# Patient Record
Sex: FEMALE | Race: WHITE | NOT HISPANIC OR LATINO | Employment: UNEMPLOYED | URBAN - METROPOLITAN AREA
[De-identification: names, ages, dates, MRNs, and addresses within clinical notes are randomized per-mention and may not be internally consistent; named-entity substitution may affect disease eponyms.]

---

## 2023-12-01 LAB
EXTERNAL HIV SCREEN: NORMAL
HBA1C MFR BLD HPLC: 5.4 %
HCV AB SER-ACNC: NEGATIVE

## 2024-03-27 ENCOUNTER — HOSPITAL ENCOUNTER (EMERGENCY)
Facility: HOSPITAL | Age: 29
Discharge: HOME | End: 2024-03-27
Attending: FAMILY MEDICINE | Admitting: FAMILY MEDICINE
Payer: MEDICAID

## 2024-03-27 VITALS
OXYGEN SATURATION: 98 % | TEMPERATURE: 97 F | RESPIRATION RATE: 16 BRPM | WEIGHT: 275 LBS | HEIGHT: 62 IN | SYSTOLIC BLOOD PRESSURE: 113 MMHG | DIASTOLIC BLOOD PRESSURE: 58 MMHG | BODY MASS INDEX: 50.61 KG/M2 | HEART RATE: 87 BPM

## 2024-03-27 DIAGNOSIS — R11.2 NAUSEA AND VOMITING, UNSPECIFIED VOMITING TYPE: Primary | ICD-10-CM

## 2024-03-27 DIAGNOSIS — Z34.90 PREGNANCY: ICD-10-CM

## 2024-03-27 DIAGNOSIS — R19.7 DIARRHEA: ICD-10-CM

## 2024-03-27 LAB
ALBUMIN SERPL BCP-MCNC: 3.7 G/DL (ref 3.5–5)
ALP SERPL-CCNC: 150 U/L (ref 34–104)
ALT SERPL W P-5'-P-CCNC: 61 U/L (ref 7–52)
ANION GAP SERPL CALCULATED.3IONS-SCNC: 10 MMOL/L (ref 4–13)
AST SERPL W P-5'-P-CCNC: 24 U/L (ref 13–39)
BACTERIA UR QL AUTO: ABNORMAL /HPF
BASOPHILS # BLD AUTO: 0.02 THOUSANDS/ÂΜL (ref 0–0.1)
BASOPHILS NFR BLD AUTO: 0 % (ref 0–1)
BILIRUB SERPL-MCNC: 0.54 MG/DL (ref 0.2–1)
BILIRUB UR QL STRIP: ABNORMAL
BUN SERPL-MCNC: 6 MG/DL (ref 5–25)
CALCIUM SERPL-MCNC: 8.5 MG/DL (ref 8.4–10.2)
CHLORIDE SERPL-SCNC: 101 MMOL/L (ref 96–108)
CLARITY UR: ABNORMAL
CO2 SERPL-SCNC: 24 MMOL/L (ref 21–32)
COLOR UR: YELLOW
CREAT SERPL-MCNC: 0.5 MG/DL (ref 0.6–1.3)
EOSINOPHIL # BLD AUTO: 0.02 THOUSAND/ÂΜL (ref 0–0.61)
EOSINOPHIL NFR BLD AUTO: 0 % (ref 0–6)
ERYTHROCYTE [DISTWIDTH] IN BLOOD BY AUTOMATED COUNT: 13 % (ref 11.6–15.1)
GFR SERPL CREATININE-BSD FRML MDRD: 132 ML/MIN/1.73SQ M
GLUCOSE SERPL-MCNC: 88 MG/DL (ref 65–140)
GLUCOSE UR STRIP-MCNC: NEGATIVE MG/DL
HCT VFR BLD AUTO: 36.9 % (ref 34.8–46.1)
HGB BLD-MCNC: 12.3 G/DL (ref 11.5–15.4)
HGB UR QL STRIP.AUTO: NEGATIVE
IMM GRANULOCYTES # BLD AUTO: 0.05 THOUSAND/UL (ref 0–0.2)
IMM GRANULOCYTES NFR BLD AUTO: 0 % (ref 0–2)
KETONES UR STRIP-MCNC: ABNORMAL MG/DL
LEUKOCYTE ESTERASE UR QL STRIP: NEGATIVE
LYMPHOCYTES # BLD AUTO: 1.23 THOUSANDS/ÂΜL (ref 0.6–4.47)
LYMPHOCYTES NFR BLD AUTO: 11 % (ref 14–44)
MCH RBC QN AUTO: 29.3 PG (ref 26.8–34.3)
MCHC RBC AUTO-ENTMCNC: 33.3 G/DL (ref 31.4–37.4)
MCV RBC AUTO: 88 FL (ref 82–98)
MONOCYTES # BLD AUTO: 0.56 THOUSAND/ÂΜL (ref 0.17–1.22)
MONOCYTES NFR BLD AUTO: 5 % (ref 4–12)
MUCOUS THREADS UR QL AUTO: ABNORMAL
NEUTROPHILS # BLD AUTO: 9.69 THOUSANDS/ÂΜL (ref 1.85–7.62)
NEUTS SEG NFR BLD AUTO: 84 % (ref 43–75)
NITRITE UR QL STRIP: NEGATIVE
NON-SQ EPI CELLS URNS QL MICRO: ABNORMAL /HPF
NRBC BLD AUTO-RTO: 0 /100 WBCS
PH UR STRIP.AUTO: 7 [PH]
PLATELET # BLD AUTO: 148 THOUSANDS/UL (ref 149–390)
PMV BLD AUTO: 11.6 FL (ref 8.9–12.7)
POTASSIUM SERPL-SCNC: 3.8 MMOL/L (ref 3.5–5.3)
PROT SERPL-MCNC: 7.3 G/DL (ref 6.4–8.4)
PROT UR STRIP-MCNC: ABNORMAL MG/DL
RBC # BLD AUTO: 4.2 MILLION/UL (ref 3.81–5.12)
RBC #/AREA URNS AUTO: ABNORMAL /HPF
SODIUM SERPL-SCNC: 135 MMOL/L (ref 135–147)
SP GR UR STRIP.AUTO: 1.02
UROBILINOGEN UR QL STRIP.AUTO: 1 E.U./DL
WBC # BLD AUTO: 11.57 THOUSAND/UL (ref 4.31–10.16)
WBC #/AREA URNS AUTO: ABNORMAL /HPF

## 2024-03-27 PROCEDURE — 85025 COMPLETE CBC W/AUTO DIFF WBC: CPT

## 2024-03-27 PROCEDURE — 96375 TX/PRO/DX INJ NEW DRUG ADDON: CPT

## 2024-03-27 PROCEDURE — 81001 URINALYSIS AUTO W/SCOPE: CPT

## 2024-03-27 PROCEDURE — 36415 COLL VENOUS BLD VENIPUNCTURE: CPT

## 2024-03-27 PROCEDURE — 99283 EMERGENCY DEPT VISIT LOW MDM: CPT

## 2024-03-27 PROCEDURE — 96376 TX/PRO/DX INJ SAME DRUG ADON: CPT

## 2024-03-27 PROCEDURE — 96365 THER/PROPH/DIAG IV INF INIT: CPT

## 2024-03-27 PROCEDURE — 80053 COMPREHEN METABOLIC PANEL: CPT

## 2024-03-27 PROCEDURE — 87086 URINE CULTURE/COLONY COUNT: CPT

## 2024-03-27 RX ORDER — ONDANSETRON 4 MG/1
4 TABLET, FILM COATED ORAL EVERY 12 HOURS PRN
Qty: 14 TABLET | Refills: 0 | Status: SHIPPED | OUTPATIENT
Start: 2024-03-27 | End: 2024-04-03

## 2024-03-27 RX ORDER — ONDANSETRON 2 MG/ML
4 INJECTION INTRAMUSCULAR; INTRAVENOUS ONCE
Status: COMPLETED | OUTPATIENT
Start: 2024-03-27 | End: 2024-03-27

## 2024-03-27 RX ORDER — SODIUM CHLORIDE, SODIUM GLUCONATE, SODIUM ACETATE, POTASSIUM CHLORIDE, MAGNESIUM CHLORIDE, SODIUM PHOSPHATE, DIBASIC, AND POTASSIUM PHOSPHATE .53; .5; .37; .037; .03; .012; .00082 G/100ML; G/100ML; G/100ML; G/100ML; G/100ML; G/100ML; G/100ML
1000 INJECTION, SOLUTION INTRAVENOUS ONCE
Status: COMPLETED | OUTPATIENT
Start: 2024-03-27 | End: 2024-03-27

## 2024-03-27 RX ADMIN — ONDANSETRON 4 MG: 2 INJECTION INTRAMUSCULAR; INTRAVENOUS at 11:51

## 2024-03-27 RX ADMIN — ONDANSETRON 4 MG: 2 INJECTION INTRAMUSCULAR; INTRAVENOUS at 10:18

## 2024-03-27 RX ADMIN — SODIUM CHLORIDE, SODIUM GLUCONATE, SODIUM ACETATE, POTASSIUM CHLORIDE, MAGNESIUM CHLORIDE, SODIUM PHOSPHATE, DIBASIC, AND POTASSIUM PHOSPHATE 1000 ML: .53; .5; .37; .037; .03; .012; .00082 INJECTION, SOLUTION INTRAVENOUS at 10:19

## 2024-03-27 NOTE — ED PROVIDER NOTES
History  Chief Complaint   Patient presents with    Vomiting     Patient arrives with complaints of illness for the past 2 weeks. States last night started with N/V/D. 26 weeks pregnant.      Patient is a 28-year-old female with no significant past medical history. Patient is 28 weeks pregnant. . Presenting today with chief complaints of nausea, vomiting, and diarrhea. Patient states that she began to not feel well about 2 weeks ago starting with a sore throat and nasal congestion. Since then, symptoms had resolved. Yesterday around 6 pm she started with nausea, vomiting, and diarrhea. Has experienced about 5 episodes of diarrhea, some abdominal cramping, and 4 episodes of vomiting. Explains that the diarrhea is more of a soft stool consistency and is not watery. Denies any blood present. Denies any fever, chills, chest pain, shortness of breath, dysuria, hematuria or vaginal bleeding. States that she has had a decreased appetite leading up to the nausea and vomiting episodes and has been drinking water in small quantities secondary to the nausea. Patient is currently living in a household with 6 other children who have also been sick with similar symptoms. She has recently moved to the area from [place] and does not have a local OBGYN. Patient has stopped taking prenatal vitamins about 3 months ago as they were making her sick. Per patient, her previous OBGYN said stopping prenatals was okay as long as she was taking a child's multivitamin which patient states that she is taking. No history of preeclampsia or gestational diabetes.      Vomiting  Associated symptoms: diarrhea and headaches    Associated symptoms: no chills, no fever and no sore throat        Prior to Admission Medications   Prescriptions Last Dose Informant Patient Reported? Taking?   Prenatal MV-Min-Fe Fum-FA-DHA (PRENATAL 1 PO)   Yes Yes   Sig: Take by mouth      Facility-Administered Medications: None       History reviewed. No pertinent  past medical history.    Past Surgical History:   Procedure Laterality Date    FRACTURE SURGERY         History reviewed. No pertinent family history.  I have reviewed and agree with the history as documented.    E-Cigarette/Vaping    E-Cigarette Use Never User      E-Cigarette/Vaping Substances     Social History     Tobacco Use    Smoking status: Never    Smokeless tobacco: Never   Vaping Use    Vaping status: Never Used   Substance Use Topics    Alcohol use: Not Currently    Drug use: Not Currently       Review of Systems   Constitutional:  Positive for fatigue. Negative for chills and fever.   HENT:  Negative for congestion, rhinorrhea and sore throat.    Respiratory:  Negative for chest tightness and shortness of breath.    Cardiovascular:  Negative for chest pain, palpitations and leg swelling.   Gastrointestinal:  Positive for diarrhea, nausea and vomiting. Negative for blood in stool.   Genitourinary:  Negative for decreased urine volume, dysuria and hematuria.   Neurological:  Positive for headaches.   All other systems reviewed and are negative.      Physical Exam  Physical Exam  Vitals and nursing note reviewed.   Constitutional:       Appearance: Normal appearance.   HENT:      Head: Normocephalic and atraumatic.      Right Ear: Tympanic membrane, ear canal and external ear normal.      Left Ear: Tympanic membrane, ear canal and external ear normal.      Mouth/Throat:      Mouth: Mucous membranes are moist.      Pharynx: Oropharynx is clear.   Cardiovascular:      Rate and Rhythm: Normal rate and regular rhythm.      Pulses: Normal pulses.      Heart sounds: Normal heart sounds.   Pulmonary:      Effort: Pulmonary effort is normal.      Breath sounds: Normal breath sounds.   Abdominal:      General: Bowel sounds are normal.      Tenderness: There is no abdominal tenderness. There is no guarding.      Comments: Abdomen soft and rounded, 28 weeks gestation   Musculoskeletal:         General: Normal range  of motion.   Skin:     General: Skin is warm and dry.      Capillary Refill: Capillary refill takes less than 2 seconds.   Neurological:      General: No focal deficit present.      Mental Status: She is alert and oriented to person, place, and time.   Psychiatric:         Mood and Affect: Mood normal.         Behavior: Behavior normal.         Vital Signs  ED Triage Vitals   Temperature Pulse Respirations Blood Pressure SpO2   03/27/24 1004 03/27/24 1004 03/27/24 1004 03/27/24 1004 03/27/24 1004   (!) 97 °F (36.1 °C) 98 18 131/60 99 %      Temp Source Heart Rate Source Patient Position - Orthostatic VS BP Location FiO2 (%)   03/27/24 1004 03/27/24 1223 03/27/24 1004 03/27/24 1004 --   Temporal Monitor Sitting Left arm       Pain Score       03/27/24 1004       6           Vitals:    03/27/24 1004 03/27/24 1050 03/27/24 1054 03/27/24 1223   BP: 131/60 132/67 122/59 113/58   Pulse: 98   87   Patient Position - Orthostatic VS: Sitting  Lying Lying         Visual Acuity      ED Medications  Medications   multi-electrolyte (ISOLYTE-S PH 7.4) bolus 1,000 mL (0 mL Intravenous Stopped 3/27/24 1119)   ondansetron (ZOFRAN) injection 4 mg (4 mg Intravenous Given 3/27/24 1018)   ondansetron (ZOFRAN) injection 4 mg (4 mg Intravenous Given 3/27/24 1151)       Diagnostic Studies  Results Reviewed       Procedure Component Value Units Date/Time    Urine Microscopic [[id number]]  (Abnormal) Collected: 03/27/24 1120    Lab Status: Final result Specimen: Urine, Clean Catch Updated: 03/27/24 1131     RBC, UA 0-1 /hpf      WBC, UA 0-1 /hpf      Epithelial Cells Occasional /hpf      Bacteria, UA Occasional /hpf      MUCUS THREADS Occasional     URINE COMMENT --    UA w Reflex to Microscopic w Reflex to Culture [[id number]]  (Abnormal) Collected: 03/27/24 1120    Lab Status: Final result Specimen: Urine, Clean Catch Updated: 03/27/24 1126     Color, UA Yellow     Clarity, UA Hazy     Specific Gravity, UA 1.025     pH, UA 7.0      Leukocytes, UA Negative     Nitrite, UA Negative     Protein, UA Trace mg/dl      Glucose, UA Negative mg/dl      Ketones, UA 80 (3+) mg/dl      Urobilinogen, UA 1.0 E.U./dl      Bilirubin, UA 1+     Occult Blood, UA Negative     URINE COMMENT --    Urine culture [[id number]] Collected: 03/27/24 1120    Lab Status: In process Specimen: Urine, Clean Catch Updated: 03/27/24 1126    CBC and differential [[id number]]  (Abnormal) Collected: 03/27/24 1010    Lab Status: Final result Specimen: Blood from Arm, Right Updated: 03/27/24 1032     WBC 11.57 Thousand/uL      RBC 4.20 Million/uL      Hemoglobin 12.3 g/dL      Hematocrit 36.9 %      MCV 88 fL      MCH 29.3 pg      MCHC 33.3 g/dL      RDW 13.0 %      MPV 11.6 fL      Platelets 148 Thousands/uL      nRBC 0 /100 WBCs      Neutrophils Relative 84 %      Immature Grans % 0 %      Lymphocytes Relative 11 %      Monocytes Relative 5 %      Eosinophils Relative 0 %      Basophils Relative 0 %      Neutrophils Absolute 9.69 Thousands/µL      Absolute Immature Grans 0.05 Thousand/uL      Absolute Lymphocytes 1.23 Thousands/µL      Absolute Monocytes 0.56 Thousand/µL      Eosinophils Absolute 0.02 Thousand/µL      Basophils Absolute 0.02 Thousands/µL     Comprehensive metabolic panel [[id number]]  (Abnormal) Collected: 03/27/24 1010    Lab Status: Final result Specimen: Blood from Arm, Right Updated: 03/27/24 1029     Sodium 135 mmol/L      Potassium 3.8 mmol/L      Chloride 101 mmol/L      CO2 24 mmol/L      ANION GAP 10 mmol/L      BUN 6 mg/dL      Creatinine 0.50 mg/dL      Glucose 88 mg/dL      Calcium 8.5 mg/dL      AST 24 U/L      ALT 61 U/L      Alkaline Phosphatase 150 U/L      Total Protein 7.3 g/dL      Albumin 3.7 g/dL      Total Bilirubin 0.54 mg/dL      eGFR 132 ml/min/1.73sq m     Narrative:      National Kidney Disease Foundation guidelines for Chronic Kidney Disease (CKD):     Stage 1 with normal or high GFR (GFR > 90 mL/min/1.73 square meters)    Stage 2 Mild  CKD (GFR = 60-89 mL/min/1.73 square meters)    Stage 3A Moderate CKD (GFR = 45-59 mL/min/1.73 square meters)    Stage 3B Moderate CKD (GFR = 30-44 mL/min/1.73 square meters)    Stage 4 Severe CKD (GFR = 15-29 mL/min/1.73 square meters)    Stage 5 End Stage CKD (GFR <15 mL/min/1.73 square meters)  Note: GFR calculation is accurate only with a steady state creatinine                   No orders to display              Procedures  Procedures         ED Course  ED Course as of 03/27/24 1411   Wed Mar 27, 2024   1052 Fetal US showing HR of 151 BMP   1114 CBC and differential(!)  Elevated WBC 11.57, no anemia present.   1115 Comprehensive metabolic panel(!)  Slightly elevated ALK phos and ALT, otherwise stable electrolytes.   1148 Reviewed blood work/urine sample results with patient. Patient reports still feeling nauseous but has not vomited. Tolerated drinking water. Once fluids are finished, will reassess.   1219 Patient reports some relief after second dose of Zofran.                               SBIRT 20yo+      Flowsheet Row Most Recent Value   Initial Alcohol Screen: US AUDIT-C     1. How often do you have a drink containing alcohol? 0 Filed at: 03/27/2024 1007   2. How many drinks containing alcohol do you have on a typical day you are drinking?  0 Filed at: 03/27/2024 1007   3a. Male UNDER 65: How often do you have five or more drinks on one occasion? 0 Filed at: 03/27/2024 1007   3b. FEMALE Any Age, or MALE 65+: How often do you have 4 or more drinks on one occassion? 0 Filed at: 03/27/2024 1007   Audit-C Score 0 Filed at: 03/27/2024 1007   AMEE: How many times in the past year have you...    Used an illegal drug or used a prescription medication for non-medical reasons? Never Filed at: 03/27/2024 1007                      Medical Decision Making  Patient presents for nausea, vomiting, and diarrhea starting yesterday. Upon examination, patient is resting comfortably on the stretcher. Vital signs are within  normal limits. Patient is afebrile. Initial blood pressure was 131/60, repeat blood pressure of 122/59. Examination of ears show normal internal canals and tympanic membranes. Oral cavity is moist. No abnormal heart sounds and lungs are clear bilaterally. Bowel sounds present x4. Abdomen is soft and rounded. Fetal US performed and fetal heart qepe=334 BPM. No lower extremity edema present.    DDX include but are not limited to: electrolyte imbalance, dehydration, urinary tract infection, COVID, influenza, RSV, and other viral syndromes.    Discussed with patient treatment plan to include:  Blood work (CBC, CMP)  Urinalysis  IV hydration  Antiemetics     CMP does not indicate electrolyte or acute dehydration. CBC showing slight elevation in WBC of 11.57, likely a result of multiple episodes of vomiting and diarrhea. Urinalysis is not concerning for UTI. Discussed results with patient. Symptoms are likely viral in nature. Encouraged patient to  Zofran prescription at pharmacy and call to establish practice with PCP and OBGYN (referrals placed). Encouraged increase fluid intake. Strict ED return precautions reviewed and patient verbalized understanding of discharge instructions.     Amount and/or Complexity of Data Reviewed  Labs: ordered. Decision-making details documented in ED Course.     Details: Reviewed with patient.     Risk  Prescription drug management.             Disposition  Final diagnoses:   Nausea and vomiting, unspecified vomiting type   Diarrhea   Pregnancy     Time reflects when diagnosis was documented in both MDM as applicable and the Disposition within this note       Time User Action Codes Description Comment    3/27/2024 10:17 AM Jenniffer Liceal Add [R11.2] Nausea and vomiting, unspecified vomiting type     3/27/2024 10:17 AM Tonya Licea Add [R19.7] Diarrhea     3/27/2024 10:21 AM Tonya Licea Add [Z3A.28] 28 weeks gestation of pregnancy     3/27/2024 10:21 AM Jenniffer Liceal  Add [Z34.90] Pregnancy     3/27/2024 11:05 AM Tonya Licea Remove [Z3A.28] 28 weeks gestation of pregnancy           ED Disposition       ED Disposition   Discharge    Condition   Stable    Date/Time   Wed Mar 27, 2024 1219    Comment   Ramez Scott discharge to home/self care.                   Follow-up Information       Follow up With Specialties Details Why Contact Info Additional Information    [facility] Family Medicine Call in 1 week establish practice [address] [address] [phone] [facility], [address], [place], [address], [phone]    [facility] Emergency Medicine  If symptoms worsen [address] [facility] [address] [phone] [facility] Emergency Department, [address], [place] [address]            Discharge Medication List as of 3/27/2024 12:22 PM        START taking these medications    Details   ondansetron (ZOFRAN) 4 mg tablet Take 1 tablet (4 mg total) by mouth every 12 (twelve) hours as needed for nausea or vomiting for up to 7 days, Starting Wed 3/27/2024, Until Wed 4/3/2024 at 2359, Normal           CONTINUE these medications which have NOT CHANGED    Details   Prenatal MV-Min-Fe Fum-FA-DHA (PRENATAL 1 PO) Take by mouth, Historical Med                 PDMP Review       None            ED Provider  Electronically Signed by             JERALD Sandy  03/27/24 3118

## 2024-03-27 NOTE — DISCHARGE INSTRUCTIONS
May use Zofran 2x/day for nausea. Continue to increase fluid intake and rest. Call to make appointment with PCP and OBGYN as referrals have been placed. Return to ED for any worsening symptoms.

## 2024-03-29 LAB — BACTERIA UR CULT: NORMAL

## 2024-04-22 ENCOUNTER — TELEPHONE (OUTPATIENT)
Age: 29
End: 2024-04-22

## 2024-04-22 NOTE — TELEPHONE ENCOUNTER
Patient called and she is transferring from [facility] and she is 31 weeks pregnant and she had all of her records faxed over. She has not been seen since 20 weeks pregnant.  Please call patient back at [phone] . Thank you

## 2024-04-24 ENCOUNTER — ROUTINE PRENATAL (OUTPATIENT)
Dept: OBGYN CLINIC | Facility: MEDICAL CENTER | Age: 29
End: 2024-04-24
Payer: COMMERCIAL

## 2024-04-24 ENCOUNTER — TELEPHONE (OUTPATIENT)
Age: 29
End: 2024-04-24

## 2024-04-24 VITALS
HEIGHT: 62 IN | SYSTOLIC BLOOD PRESSURE: 115 MMHG | BODY MASS INDEX: 52.33 KG/M2 | WEIGHT: 284.4 LBS | DIASTOLIC BLOOD PRESSURE: 64 MMHG

## 2024-04-24 DIAGNOSIS — Z23 ENCOUNTER FOR IMMUNIZATION: ICD-10-CM

## 2024-04-24 DIAGNOSIS — D69.6 THROMBOCYTOPENIA AFFECTING PREGNANCY (HCC): ICD-10-CM

## 2024-04-24 DIAGNOSIS — Z3A.32 32 WEEKS GESTATION OF PREGNANCY: ICD-10-CM

## 2024-04-24 DIAGNOSIS — O99.119 THROMBOCYTOPENIA AFFECTING PREGNANCY (HCC): ICD-10-CM

## 2024-04-24 DIAGNOSIS — Z3A.31 31 WEEKS GESTATION OF PREGNANCY: Primary | ICD-10-CM

## 2024-04-24 DIAGNOSIS — O36.60X0 FETAL MACROSOMIA AFFECTING MANAGEMENT OF MOTHER, ANTEPARTUM: ICD-10-CM

## 2024-04-24 DIAGNOSIS — O09.293 HISTORY OF SHOULDER DYSTOCIA IN PRIOR PREGNANCY, CURRENTLY PREGNANT IN THIRD TRIMESTER: ICD-10-CM

## 2024-04-24 DIAGNOSIS — O99.210 OBESITY IN PREGNANCY, ANTEPARTUM: ICD-10-CM

## 2024-04-24 DIAGNOSIS — O99.810 ABNORMAL GLUCOSE AFFECTING PREGNANCY: ICD-10-CM

## 2024-04-24 PROCEDURE — 99213 OFFICE O/P EST LOW 20 MIN: CPT | Performed by: OBSTETRICS & GYNECOLOGY

## 2024-04-24 PROCEDURE — 90715 TDAP VACCINE 7 YRS/> IM: CPT | Performed by: OBSTETRICS & GYNECOLOGY

## 2024-04-24 PROCEDURE — 90471 IMMUNIZATION ADMIN: CPT | Performed by: OBSTETRICS & GYNECOLOGY

## 2024-04-24 RX ORDER — FAMOTIDINE 20 MG/1
20 TABLET, FILM COATED ORAL 2 TIMES DAILY
COMMUNITY
End: 2024-05-15

## 2024-04-25 ENCOUNTER — APPOINTMENT (OUTPATIENT)
Dept: LAB | Facility: CLINIC | Age: 29
End: 2024-04-25
Payer: COMMERCIAL

## 2024-04-25 DIAGNOSIS — Z3A.31 31 WEEKS GESTATION OF PREGNANCY: ICD-10-CM

## 2024-04-25 LAB
ABO GROUP BLD: NORMAL
BASOPHILS # BLD AUTO: 0.03 THOUSANDS/ÂΜL (ref 0–0.1)
BASOPHILS NFR BLD AUTO: 0 % (ref 0–1)
BLD GP AB SCN SERPL QL: NEGATIVE
EOSINOPHIL # BLD AUTO: 0.03 THOUSAND/ÂΜL (ref 0–0.61)
EOSINOPHIL NFR BLD AUTO: 0 % (ref 0–6)
ERYTHROCYTE [DISTWIDTH] IN BLOOD BY AUTOMATED COUNT: 12.7 % (ref 11.6–15.1)
GLUCOSE 1H P 50 G GLC PO SERPL-MCNC: 138 MG/DL (ref 70–134)
HCT VFR BLD AUTO: 34.4 % (ref 34.8–46.1)
HGB BLD-MCNC: 11.5 G/DL (ref 11.5–15.4)
IMM GRANULOCYTES # BLD AUTO: 0.03 THOUSAND/UL (ref 0–0.2)
IMM GRANULOCYTES NFR BLD AUTO: 0 % (ref 0–2)
LYMPHOCYTES # BLD AUTO: 1.78 THOUSANDS/ÂΜL (ref 0.6–4.47)
LYMPHOCYTES NFR BLD AUTO: 17 % (ref 14–44)
MCH RBC QN AUTO: 28.8 PG (ref 26.8–34.3)
MCHC RBC AUTO-ENTMCNC: 33.4 G/DL (ref 31.4–37.4)
MCV RBC AUTO: 86 FL (ref 82–98)
MONOCYTES # BLD AUTO: 0.5 THOUSAND/ÂΜL (ref 0.17–1.22)
MONOCYTES NFR BLD AUTO: 5 % (ref 4–12)
NEUTROPHILS # BLD AUTO: 8.12 THOUSANDS/ÂΜL (ref 1.85–7.62)
NEUTS SEG NFR BLD AUTO: 78 % (ref 43–75)
NRBC BLD AUTO-RTO: 0 /100 WBCS
PLATELET # BLD AUTO: 132 THOUSANDS/UL (ref 149–390)
PMV BLD AUTO: 11.9 FL (ref 8.9–12.7)
RBC # BLD AUTO: 4 MILLION/UL (ref 3.81–5.12)
RH BLD: POSITIVE
SPECIMEN EXPIRATION DATE: NORMAL
TREPONEMA PALLIDUM IGG+IGM AB [PRESENCE] IN SERUM OR PLASMA BY IMMUNOASSAY: NORMAL
WBC # BLD AUTO: 10.49 THOUSAND/UL (ref 4.31–10.16)

## 2024-04-25 PROCEDURE — 82950 GLUCOSE TEST: CPT

## 2024-04-25 PROCEDURE — 86780 TREPONEMA PALLIDUM: CPT

## 2024-04-25 PROCEDURE — 86850 RBC ANTIBODY SCREEN: CPT

## 2024-04-25 PROCEDURE — 87491 CHLMYD TRACH DNA AMP PROBE: CPT | Performed by: OBSTETRICS & GYNECOLOGY

## 2024-04-25 PROCEDURE — 87591 N.GONORRHOEAE DNA AMP PROB: CPT | Performed by: OBSTETRICS & GYNECOLOGY

## 2024-04-25 PROCEDURE — 85025 COMPLETE CBC W/AUTO DIFF WBC: CPT

## 2024-04-25 PROCEDURE — 86901 BLOOD TYPING SEROLOGIC RH(D): CPT

## 2024-04-25 PROCEDURE — 86900 BLOOD TYPING SEROLOGIC ABO: CPT

## 2024-04-25 PROCEDURE — 36415 COLL VENOUS BLD VENIPUNCTURE: CPT

## 2024-04-26 LAB
C TRACH DNA SPEC QL NAA+PROBE: NEGATIVE
N GONORRHOEA DNA SPEC QL NAA+PROBE: NEGATIVE

## 2024-04-30 DIAGNOSIS — O99.810 ABNORMAL GLUCOSE AFFECTING PREGNANCY: Primary | ICD-10-CM

## 2024-04-30 PROBLEM — O99.210 OBESITY IN PREGNANCY, ANTEPARTUM: Status: ACTIVE | Noted: 2024-04-30

## 2024-04-30 PROBLEM — O09.293 HISTORY OF SHOULDER DYSTOCIA IN PRIOR PREGNANCY, CURRENTLY PREGNANT IN THIRD TRIMESTER: Status: ACTIVE | Noted: 2024-04-30

## 2024-05-01 ENCOUNTER — ROUTINE PRENATAL (OUTPATIENT)
Dept: PERINATAL CARE | Facility: OTHER | Age: 29
End: 2024-05-01
Payer: COMMERCIAL

## 2024-05-01 ENCOUNTER — TELEPHONE (OUTPATIENT)
Dept: PERINATAL CARE | Facility: OTHER | Age: 29
End: 2024-05-01

## 2024-05-01 VITALS
WEIGHT: 288.8 LBS | DIASTOLIC BLOOD PRESSURE: 70 MMHG | SYSTOLIC BLOOD PRESSURE: 112 MMHG | HEART RATE: 95 BPM | HEIGHT: 62 IN | BODY MASS INDEX: 53.15 KG/M2

## 2024-05-01 DIAGNOSIS — O36.60X0 FETAL MACROSOMIA AFFECTING MANAGEMENT OF MOTHER, ANTEPARTUM: ICD-10-CM

## 2024-05-01 DIAGNOSIS — Z3A.32 32 WEEKS GESTATION OF PREGNANCY: ICD-10-CM

## 2024-05-01 DIAGNOSIS — Z3A.31 31 WEEKS GESTATION OF PREGNANCY: ICD-10-CM

## 2024-05-01 DIAGNOSIS — Z36.89 ENCOUNTER FOR ULTRASOUND TO ASSESS FETAL GROWTH: ICD-10-CM

## 2024-05-01 DIAGNOSIS — O99.810 ABNORMAL GLUCOSE AFFECTING PREGNANCY: ICD-10-CM

## 2024-05-01 DIAGNOSIS — O99.210 OBESITY IN PREGNANCY, ANTEPARTUM: Primary | ICD-10-CM

## 2024-05-01 DIAGNOSIS — O09.293 HISTORY OF SHOULDER DYSTOCIA IN PRIOR PREGNANCY, CURRENTLY PREGNANT IN THIRD TRIMESTER: ICD-10-CM

## 2024-05-01 PROBLEM — O99.119 THROMBOCYTOPENIA AFFECTING PREGNANCY (HCC): Status: ACTIVE | Noted: 2024-05-01

## 2024-05-01 PROBLEM — D69.6 THROMBOCYTOPENIA AFFECTING PREGNANCY (HCC): Status: ACTIVE | Noted: 2024-05-01

## 2024-05-01 PROCEDURE — 76816 OB US FOLLOW-UP PER FETUS: CPT | Performed by: OBSTETRICS & GYNECOLOGY

## 2024-05-01 PROCEDURE — 99214 OFFICE O/P EST MOD 30 MIN: CPT | Performed by: OBSTETRICS & GYNECOLOGY

## 2024-05-01 NOTE — PROGRESS NOTES
"[facility]: Ramez Scott was seen today at 32w6d for fetal growth assessment ultrasound.  See ultrasound report under \"OB Procedures\" tab.  Please don't hesitate to contact our office with any concerns or questions.  -Judith Vogel MD    "

## 2024-05-01 NOTE — LETTER
"May 1, 2024     Jayden Ledbetter MD  [address] [place] [address]    Patient: Ramez Scott   YOB: 1995   Date of Visit: 2024       Dear Dr. Ledbetter:    Thank you for referring Ramez Scott to me for evaluation. Below are my notes for this consultation.    If you have questions, please do not hesitate to call me. I look forward to following your patient along with you.         Sincerely,        Judith Vogel MD        CC: No Recipients    Judith Vogel MD  2024  1:40 PM  Sign when Signing Visit  [facility]: Ramez Scott was seen today at 32w6d for fetal growth assessment ultrasound.  See ultrasound report under \"OB Procedures\" tab.  Please don't hesitate to contact our office with any concerns or questions.  -Judith Vogel MD    "

## 2024-05-02 ENCOUNTER — INITIAL PRENATAL (OUTPATIENT)
Dept: OBGYN CLINIC | Facility: MEDICAL CENTER | Age: 29
End: 2024-05-02
Payer: COMMERCIAL

## 2024-05-02 ENCOUNTER — APPOINTMENT (OUTPATIENT)
Dept: LAB | Facility: HOSPITAL | Age: 29
End: 2024-05-02
Attending: OBSTETRICS & GYNECOLOGY
Payer: COMMERCIAL

## 2024-05-02 ENCOUNTER — TELEPHONE (OUTPATIENT)
Facility: HOSPITAL | Age: 29
End: 2024-05-02

## 2024-05-02 VITALS
HEIGHT: 62 IN | SYSTOLIC BLOOD PRESSURE: 118 MMHG | DIASTOLIC BLOOD PRESSURE: 64 MMHG | BODY MASS INDEX: 53.07 KG/M2 | WEIGHT: 288.4 LBS

## 2024-05-02 DIAGNOSIS — O99.810 ABNORMAL GLUCOSE AFFECTING PREGNANCY: ICD-10-CM

## 2024-05-02 DIAGNOSIS — O99.210 OBESITY IN PREGNANCY, ANTEPARTUM: ICD-10-CM

## 2024-05-02 DIAGNOSIS — Z34.83 PRENATAL CARE, SUBSEQUENT PREGNANCY, THIRD TRIMESTER: ICD-10-CM

## 2024-05-02 DIAGNOSIS — O99.810 ABNORMAL GLUCOSE AFFECTING PREGNANCY: Primary | ICD-10-CM

## 2024-05-02 DIAGNOSIS — O24.419 GESTATIONAL DIABETES MELLITUS (GDM) IN THIRD TRIMESTER, GESTATIONAL DIABETES METHOD OF CONTROL UNSPECIFIED: ICD-10-CM

## 2024-05-02 LAB
GLUCOSE 1H P 100 G GLC PO SERPL-MCNC: 207 MG/DL (ref 65–179)
GLUCOSE 2H P 100 G GLC PO SERPL-MCNC: 181 MG/DL (ref 65–154)
GLUCOSE 3H P 100 G GLC PO SERPL-MCNC: 100 MG/DL (ref 65–139)
GLUCOSE P FAST SERPL-MCNC: 87 MG/DL (ref 65–94)

## 2024-05-02 PROCEDURE — 82952 GTT-ADDED SAMPLES: CPT

## 2024-05-02 PROCEDURE — 36415 COLL VENOUS BLD VENIPUNCTURE: CPT

## 2024-05-02 PROCEDURE — T1001 NURSING ASSESSMENT/EVALUATN: HCPCS

## 2024-05-02 PROCEDURE — 82951 GLUCOSE TOLERANCE TEST (GTT): CPT

## 2024-05-02 RX ORDER — RANITIDINE 150 MG/1
150 CAPSULE ORAL 2 TIMES DAILY
COMMUNITY

## 2024-05-02 RX ORDER — CETIRIZINE HYDROCHLORIDE 10 MG/1
10 TABLET, CHEWABLE ORAL DAILY
COMMUNITY

## 2024-05-02 NOTE — PROGRESS NOTES
OB INTAKE INTERVIEW May 2, 2024    Patient is 28 y.o. who presents for OB intake at 33w0d.  She is not accompanied by anyone during this encounter.  The father of her baby (Ap Larry) is involved in the pregnancy and he is 39 years old.      Patient's last menstrual period was 2023 (exact date).  Ultrasound: Measured 8 weeks 4 days on 11/10/23  Estimated Date of Delivery: 24 confirmed by dating ultrasound - performed in [place].    Signs/Symptoms of Pregnancy:  Current pregnancy symptoms: breast tenderness, fatigue, and frequent urination  Constipation no  Headaches no  Cramping/spotting no  PICA cravings no    Diabetes:  Body mass index is 52.75 kg/m².  If patient has 1 or more, please order early 1 hour GTT  History of GDM no  BMI >35 YES - BMI @33 wk OB intake 52.7  History of PCOS or current metformin use no  History of LGA/macrosomic infant (4000g/9lbs) no    If patient has 2 or more, please order early 1 hour GTT  BMI>30 YES - BMI @33 wk OB intake 52.7  AMA no  First degree relative with type 2 diabetes no  History of chronic HTN, hyperlipidemia, elevated A1C no  High risk race (, , ,  or ) no    Hypertension: if you answer yes to any of the following, please order baseline preeclampsia labs (cbc, comprehensive metabolic panel, urine protein creatinine ratio, uric acid)  History of of chronic HTN no  History of gestational HTN no  History of preeclampsia, eclampsia, or HELLP syndrome no  History of diabetes no  History of lupus, autoimmune disease, kidney disease no    Thyroid: if yes order TSH with reflex T4  History of thyroid disease no    Bleeding Disorder or Hx of DVT - patient or first degree relative with history of. Order the following if not done previously.   (Factor V, antithrombin III, prothrombin gene mutation, protein C and S Ag, lupus anticoagulant, anticardiolipin, beta-2 glycoprotein)   no    OB/GYN:  History of abnormal pap  smear no       Date of last pap smear 23  History of HPV no  History of Herpes/HSV no  History of other STI (gonorrhea, chlamydia, trich) YES- in the past and treated  History of prior  YES -x4 - last pregnancy shoulder dystocia  History of prior  YES  History of  delivery prior to 36 weeks 6 days no  History of blood transfusion no  Ok for blood transfusion YES    Substance screening:   History of tobacco use YES  Currently using tobacco no  Currently using alcohol no  Presently using drugs no  Past drug use  no  IV drug use- no  Partner drug use no  Parent/Family drug use no  Substance Use Screen Level No risk    MRSA Screening:   Does the pt have a hx of MRSA? no    Immunizations:  Discussed Tdap vaccine YES   Discussed COVID Vaccine YES  - never had it    Genetic/MFM:  Do you or your partner have a history of any of the following in yourselves or first degree relatives?  Cystic fibrosis no  Spinal muscular atrophy no  Hemoglobinopathy/Sickle Cell/Thalassemia no  Fragile X Intellectual Disability no    Discussed Carrier Screening being completed once in a lifetime as a standard of care lab test. Place orders for Cystic Fibrosis Gene Test (NXD925) and Spinal Muscular Atrophy DNA ([id number]).  Patient was informed that prior authorization needs to be completed for these tests and this may take 7-10 business days.  Patient does not desire testing for Cystic Fibrosis and Spinal Muscular Atrophy.    Was seen by [facility] 24    Interview education:  [facility] Pregnancy Essentials Book reviewed, discussed and attached to their AVS YES     Prenatal lab work scripts NO      Aspirin/Preeclampsia Screen    Risk Level Risk Factor Recommendation   LOW Prior Uncomplicated full-term delivery YES No Aspirin recommendation        MODERATE Nulliparity YES Recommend low-dose aspirin if     BMI>30 YES - BMI @33 wk OB intake 52.7 2 or more moderate risk factors    Family History Preeclampsia (mother/sister)  no     35yr old or greater no      or Low Socioeconomic YES - [place]     IVF Pregnancy  no     Personal History Risks (low birth weight, prior adverse preg outcome, >10yr preg interval) no         HIGH History of Preeclampsia no Recommend low-dose aspirin if     Multifetal gestation no 1 or more high risk factors    Chronic HTN no     Type 1 or 2 Diabetes no     Renal Disease no     Autoimmune Disease  no      Contraindications to ASA therapy:  NSAID/ ASA allergy: no  Nasal polyps: no  Asthma with history of ASA induced bronchospasm: no    Relative contraindications:  History of GI bleed: no  Active peptic ulcer disease: no  Severe hepatic dysfunction: no    Patient does meet recommendation to take ASA 162mg during this pregnancy from 12-36 wks to lower her risk of preeclampsia.  Pt 33 wk transfer from [place] - was not instructed by prior OB to take LDASA    Mental Health:  Hx of/or current dx of depression: no, Anxiety: no  EPDS Screen:  Negative / score 0    The patient has a history now or in prior pregnancy notable for:  obesity and Gestational DM w/current pregnancy - failed 2 levels of 3 hr GTT completed today 5/2/24  pt states had a shoulder dystocia     Details that I feel the provider should be aware of: Ramez was seen here in office for her OB Intake visit 33 wk transfer from [facility] in [place], Hx obtained, reviewed recent results from 3 hr GTT today - 2 elevated values - will refer to [facility], pt aware and agreeable. Reviewed medications safe to take in pregnancy. [facility] Essentials packet/link reviewed - states she will return 28 wk forms at upcoming PNV. Records reviewed dating u/s performed 11/10/23 and 20 wk level II anatomy performed 2/1/24 - both in [place]    PN1 visit scheduled. The patient was oriented to our practice, the navigator role, reviewed delivering physicians and [facility] for delivery. All questions were answered.    Interviewed by: Renee Chisholm RN

## 2024-05-02 NOTE — PATIENT INSTRUCTIONS
Congratulations on your pregnancy!  We thank you for allowing us to participate in your care.    NEXT STEPS    Return to our office for your next  routine prenatal visit.   Our office has multiple locations. Always check the address of your appointment to ensure you are going to the correct office.       Warning Signs During Pregnancy - If you experience any problems or concerns, call the office directly.  The list below includes warning signs your providers would like you to be aware of.  If you experience any of these at any time during your pregnancy, please call us as soon as possible.    Vaginal bleeding   Sharp abdominal pain that does not go away   Fever (more than 100.4?F and is not relieved with Tylenol)   Persistent vomiting lasting greater than 24 hours   Chest pain/Shortness of breath   Pain or burning when you urinate     Call the OFFICE [phone] for any questions/emergencies.  At night or on the weekend, calls go through a triage service, please indicate it is an emergency and the DOCTOR on call will be paged.    Remember to only use [employer] for none urgent concerns or questions.    Our doctors deliver at [facility] in [place]. The address is provided below.     [address] [address]    Please click on the link below to review our Pregnancy Essential Guide.    [facility] Pregnancy Essentials Guide  [facility] ([email or web address])

## 2024-05-02 NOTE — TELEPHONE ENCOUNTER
Called patient to schedule MFM appointment, based on referral issued to Maternal Fetal Medicine by OB office.    Spoke with patient, patient prefers to call back to schedule.

## 2024-05-03 ENCOUNTER — TELEPHONE (OUTPATIENT)
Age: 29
End: 2024-05-03

## 2024-05-03 ENCOUNTER — TELEMEDICINE (OUTPATIENT)
Dept: PERINATAL CARE | Facility: CLINIC | Age: 29
End: 2024-05-03
Payer: COMMERCIAL

## 2024-05-03 DIAGNOSIS — Z3A.33 33 WEEKS GESTATION OF PREGNANCY: ICD-10-CM

## 2024-05-03 DIAGNOSIS — O24.419 GESTATIONAL DIABETES MELLITUS (GDM) IN THIRD TRIMESTER, GESTATIONAL DIABETES METHOD OF CONTROL UNSPECIFIED: Primary | ICD-10-CM

## 2024-05-03 LAB
ABO/RH(D) (HISTORICAL): NORMAL
BACTERIA UR CULT: NEGATIVE
BLD GP AB SCN SERPL QL: NEGATIVE
EXTERNAL HEMATOCRIT: 36 %
EXTERNAL HEMOGLOBIN: 12.5 G/DL
EXTERNAL HEPATITIS B SURFACE ANTIGEN: NEGATIVE
EXTERNAL HIV-1/2 AB-AG: NORMAL
EXTERNAL PLATELET COUNT: 163 K/ÂΜL
FERRITIN SERPL-MCNC: 83 NG/ML (ref 8–388)
HBA1C MFR BLD HPLC: 5.4 %
HCV AB SER-ACNC: NEGATIVE
HGB FRACT BLD ELPH-IMP: NORMAL
RPR SER QL: NORMAL
RUBELLA, IGG (HISTORICAL): NORMAL
VZV IGG SER IA-ACNC: NORMAL

## 2024-05-03 PROCEDURE — G0109 DIAB MANAGE TRN IND/GROUP: HCPCS

## 2024-05-03 RX ORDER — LANCETS 33 GAUGE
EACH MISCELLANEOUS
Qty: 100 EACH | Refills: 3 | Status: SHIPPED | OUTPATIENT
Start: 2024-05-03 | End: 2024-05-03 | Stop reason: SDUPTHER

## 2024-05-03 RX ORDER — BLOOD SUGAR DIAGNOSTIC
STRIP MISCELLANEOUS
Qty: 100 STRIP | Refills: 3 | Status: SHIPPED | OUTPATIENT
Start: 2024-05-03 | End: 2024-05-03 | Stop reason: SDUPTHER

## 2024-05-03 RX ORDER — LANCETS 33 GAUGE
EACH MISCELLANEOUS
Qty: 100 EACH | Refills: 3 | Status: SHIPPED | OUTPATIENT
Start: 2024-05-03 | End: 2024-06-20

## 2024-05-03 RX ORDER — BLOOD-GLUCOSE METER
EACH MISCELLANEOUS
Qty: 1 KIT | Refills: 0 | Status: SHIPPED | OUTPATIENT
Start: 2024-05-03 | End: 2024-05-03 | Stop reason: SDUPTHER

## 2024-05-03 RX ORDER — BLOOD SUGAR DIAGNOSTIC
STRIP MISCELLANEOUS
Qty: 100 STRIP | Refills: 3 | Status: SHIPPED | OUTPATIENT
Start: 2024-05-03 | End: 2024-06-20

## 2024-05-03 RX ORDER — BLOOD-GLUCOSE METER
EACH MISCELLANEOUS
Qty: 1 KIT | Refills: 0 | Status: SHIPPED | OUTPATIENT
Start: 2024-05-03 | End: 2024-06-20

## 2024-05-03 NOTE — TELEPHONE ENCOUNTER
Needs Diabetic monitor to be sent to her new Pharamcy, [facility] on  [phone] [phone]

## 2024-05-03 NOTE — TELEPHONE ENCOUNTER
Pt requested change of pharmacy to [facility].    Abi Hook RD  Diabetes Educator   [facility]  Diabetes and Pregnancy Program

## 2024-05-03 NOTE — PROGRESS NOTES
CLASS 1 - Individual  (virtual visit)    Thank you for referring your patient to [facility] Diabetes and Pregnancy Program.     Subjective:     Ramez Scott is a 28 y.o. female who presents today unaccompanied for Virtual Regular Visit, Patient Education, and Gestational Diabetes. Patient is at 33w1d gestation, Estimated Date of Delivery: 24.     Reviewed and updated the following from patients medical record: Demographics, Education, Occupation, PMH, Problem List, Allergies, and Current Medications. D&P Assessment responses can be found in completed self-assessment questionnaire.    Visit Diagnosis:  Encounter Diagnosis     ICD-10-CM    1. Gestational diabetes mellitus (GDM) in third trimester, gestational diabetes method of control unspecified  O24.419 Ambulatory Referral to Maternal Fetal Medicine     RxEyehart glucose flowsheet      2. 33 weeks gestation of pregnancy  Z3A.33 RxEyehart glucose flowsheet         Discussed with patient:  Pathophysiology of Gestational diabetes mellitus (GDM) in third trimester, gestational diabetes method of control unspecified [O24.419].  Untreated hyperglycemia in pregnancy and maternal fetal complications (fetal macrosomia,  hypoglycemia, polyhydramnios, increased incidence of  section,  labor, and in severe cases fetal demise and still birth).   Importance of blood glucose monitoring, nutrition, and medication if necessary in achieving BG goals.     Labs  GDM LABS:  1 Hour GTT:   Lab Results   Component Value Date/Time    [id number] 138 (H) 2024 12:11 PM      3 Hour GTT:   Lab Results   Component Value Date/Time    GLUF 87 2024 09:35 AM    LSQJREH8NT 207 (H) 2024 10:30 AM    MQKRWMF9CS 181 (H) 2024 11:35 AM    GCPOYRK2PD 100 2024 12:34 PM      A1C:  Lab Results   Component Value Date/Time    HGBA1C 5.4 2023 12:00 AM      Labs Ordered This Visit: None    Current Outpatient  "Medications    Current Outpatient Medications:     Blood Glucose Monitoring Suppl (OneTouch Verio Flex System) w/Device KIT, Test x4 Daily or as instructed, Disp: 1 kit, Rfl: 0    cetirizine (ZyrTEC) 10 MG chewable tablet, Chew 10 mg daily, Disp: , Rfl:     famotidine (PEPCID) 20 mg tablet, Take 20 mg by mouth 2 (two) times a day (Patient not taking: Reported on 5/1/2024), Disp: , Rfl:     ondansetron (ZOFRAN) 4 mg tablet, Take 1 tablet (4 mg total) by mouth every 12 (twelve) hours as needed for nausea or vomiting for up to 7 days (Patient not taking: Reported on 4/24/2024), Disp: 14 tablet, Rfl: 0    OneTouch Delica Lancets 33G MISC, Use 4 a Day or as instructed, Disp: 100 each, Rfl: 3    OneTouch Verio test strip, Test 4 Times Daily or as instructed, Disp: 100 strip, Rfl: 3    Prenatal MV-Min-Fe Fum-FA-DHA (PRENATAL 1 PO), Take by mouth, Disp: , Rfl:     ranitidine (ZANTAC) 150 MG capsule, Take 150 mg by mouth 2 (two) times a day, Disp: , Rfl:      Anthropometrics:  Ht Readings from Last 1 Encounters:   05/02/24 5' 2\" (1.575 m)      Wt Readings from Last 3 Encounters:   05/02/24 131 kg (288 lb 6.4 oz)   05/01/24 131 kg (288 lb 12.8 oz)   04/24/24 129 kg (284 lb 6.4 oz)        Pre-Gravid Wt Pre-Gravid BMI TWG   122 kg (270 lb) 49.37 8.346 kg (18 lb 6.4 oz)     Total Pregnancy Weight Gain Recommendations: BMI (> 30) 11-20 lbs  Current Wt Status Compared to Recommendations: Exceeding -- Recommended to maintain wt for remainder of pregnancy    Most Recent Ultrasound Results:  Findings: (5/1/24) AC: >99%, EFW: 96%; NML DAGO  Further Fetal Surveillance: None  Next US date: Scheduled Appropriately    Blood Glucose Monitoring:     Glucose Meter: OneTouch Verio Flex   *orders for supplies (meter, lancets, strips) based on patients needs pended/routed to appropriate provider after today's visit for review/approval     Instructed on Testing Blood Sugars: 4 x per day (Fasting, 2 hour after start of each meal)  Goals: (Fasting) " "60-95mg/dL // (2hr PP) <120mg/dL  Meter Teaching: Gave instruction on site selection, skin preparation, loading strips and lancet device, meter activation, obtaining blood sample, test strip and lancet disposal and storage, and recording log book entries.   Blood Sugar Tested in Office? No (Virtual Visit)  Instructed to report blood sugar results weekly via Phone: [phone] OR Unomy (Message with image attachment, or Glucose Flowsheet)    Meal Plan: Patient was provided with a meal plan including 3 meals and 3 snacks  *Calories: 1800 calorie (CHO: 45-15-45-15-45-30) (PRO: 2-1-3-1-3-2)    Meal Plan Tips Reviewed at Today's Visit:  Appropriate amounts of CHO, PRO, and Fat at each meal and snack.   CHO exchange list, and portion sizes for both CHO and PRO via food models  How to read a food label  Suggested meal/snack options to increase nutrition and maintain consistent meal and snack intakes  Eat every 2.0-3.5 hours while awake  Go no longer than 8-10 hours fasting overnight until first meal of the day.     Physical Activity:    Reviewed w/ Pt:   Benefits of physical activity to optimize blood glucose control, encouraged activity at patient is physically able.   Instructed pt to always consult a physician prior to starting an exercise program.   Recommend 20-30 minutes daily.     Patient Stated Goal: \"I will eat 3 meals and 3 snacks each day, including protein at each\"    Expected Compliance: good  Barriers to Learning/Change: No Barriers  Diabetes Self Management Support Plan (outside of ongoing care): Spouse/Family     Date to Report Blood Sugars: Day Before Class 2, Then Weekly (till delivery)    Class 2: Return in 1 week (on 5/10/2024) for Class 2 w/ Abi Hook RD.   *Patient instructed to bring 3 day food diary and/or write down foods associated with elevated after meal blood sugars    Begin Time: 9:00am    End Time: 10:00am    It was a pleasure working with them today. Please feel free to call " ([phone]) with any questions or concerns.    Abi Hook RD   Diabetes Educator  [facility] Maternal Fetal Medicine  Diabetes and Pregnancy Program  [address], [address]  [place] [address]    Virtual Regular Visit    Verification of patient location:    Patient is located at Home in the following state in which I hold an active license [place]      Assessment/Plan:    Problem List Items Addressed This Visit       32 weeks gestation of pregnancy     Other Visit Diagnoses       Gestational diabetes mellitus (GDM) in third trimester, gestational diabetes method of control unspecified    -  Primary    Relevant Orders    Mychart glucose flowsheet                 Reason for visit is   Chief Complaint   Patient presents with    Virtual Regular Visit    Patient Education    Gestational Diabetes          Encounter provider Abi Hook RD      Recent Visits  No visits were found meeting these conditions.  Showing recent visits within past 7 days and meeting all other requirements  Today's Visits  Date Type Provider Dept   24 Telemedicine Abi Hook RD [facility]   Showing today's visits and meeting all other requirements  Future Appointments  No visits were found meeting these conditions.  Showing future appointments within next 150 days and meeting all other requirements       The patient was identified by name and date of birth. Ramez Scott was informed that this is a telemedicine visit and that the visit is being conducted through the [employer] platform. She agrees to proceed..  My office door was closed. No one else was in the room.  She acknowledged consent and understanding of privacy and security of the video platform. The patient has agreed to participate and understands they can discontinue the visit at any time.    Patient is aware this is a billable service.     Subjective  Ramez Scott is a 28 y.o. female pregnant.      HPI     Past Medical History:   Diagnosis Date     Chlamydia     in the past    Varicella     had vaccine       Past Surgical History:   Procedure Laterality Date    FRACTURE SURGERY  2015    right hand       Current Outpatient Medications   Medication Sig Dispense Refill    Blood Glucose Monitoring Suppl (OneTouch Verio Flex System) w/Device KIT Test x4 Daily or as instructed 1 kit 0    cetirizine (ZyrTEC) 10 MG chewable tablet Chew 10 mg daily      famotidine (PEPCID) 20 mg tablet Take 20 mg by mouth 2 (two) times a day (Patient not taking: Reported on 5/1/2024)      ondansetron (ZOFRAN) 4 mg tablet Take 1 tablet (4 mg total) by mouth every 12 (twelve) hours as needed for nausea or vomiting for up to 7 days (Patient not taking: Reported on 4/24/2024) 14 tablet 0    OneTouch Delica Lancets 33G MISC Use 4 a Day or as instructed 100 each 3    OneTouch Verio test strip Test 4 Times Daily or as instructed 100 strip 3    Prenatal MV-Min-Fe Fum-FA-DHA (PRENATAL 1 PO) Take by mouth      ranitidine (ZANTAC) 150 MG capsule Take 150 mg by mouth 2 (two) times a day       No current facility-administered medications for this visit.        No Known Allergies    Review of Systems    Video Exam    There were no vitals filed for this visit.    Physical Exam     Visit Time  Total Visit Duration: 60min

## 2024-05-06 ENCOUNTER — INITIAL PRENATAL (OUTPATIENT)
Dept: OBGYN CLINIC | Facility: CLINIC | Age: 29
End: 2024-05-06
Payer: COMMERCIAL

## 2024-05-06 VITALS
WEIGHT: 290 LBS | BODY MASS INDEX: 53.37 KG/M2 | HEIGHT: 62 IN | DIASTOLIC BLOOD PRESSURE: 70 MMHG | SYSTOLIC BLOOD PRESSURE: 124 MMHG

## 2024-05-06 DIAGNOSIS — O09.293 HISTORY OF SHOULDER DYSTOCIA IN PRIOR PREGNANCY, CURRENTLY PREGNANT IN THIRD TRIMESTER: Primary | ICD-10-CM

## 2024-05-06 DIAGNOSIS — O99.210 OBESITY IN PREGNANCY, ANTEPARTUM: ICD-10-CM

## 2024-05-06 DIAGNOSIS — Z3A.33 33 WEEKS GESTATION OF PREGNANCY: ICD-10-CM

## 2024-05-06 DIAGNOSIS — O36.60X0 FETAL MACROSOMIA AFFECTING MANAGEMENT OF MOTHER, ANTEPARTUM: ICD-10-CM

## 2024-05-06 DIAGNOSIS — O24.410 DIET CONTROLLED GESTATIONAL DIABETES MELLITUS (GDM) IN THIRD TRIMESTER: ICD-10-CM

## 2024-05-06 PROCEDURE — 99214 OFFICE O/P EST MOD 30 MIN: CPT | Performed by: STUDENT IN AN ORGANIZED HEALTH CARE EDUCATION/TRAINING PROGRAM

## 2024-05-06 NOTE — ASSESSMENT & PLAN NOTE
- She recently obtained a glucometer and has taken 1 of 2 GDM classes.  She is uploading her glucoses to [employer].

## 2024-05-06 NOTE — ASSESSMENT & PLAN NOTE
Starting BMI is 49  Will need to start NST next visit     Growth in 3rd trimester   Next growth is scheduled on  - 5/10  Nst are already all scheduled with them

## 2024-05-06 NOTE — ASSESSMENT & PLAN NOTE
- We briefly discussed contraceptive options including long-acting reversible contraception and permanent sterilization. She is considering these options.

## 2024-05-06 NOTE — ASSESSMENT & PLAN NOTE
Pt is not sure if she wants  a c section   She is very interested in   Will consider IOL at 39 weeks

## 2024-05-06 NOTE — PROGRESS NOTES
Routine Prenatal Visit  [facility] [address], [place], [place]    Assessment/Plan:  Ramez is a 28 y.o. year old  at 33w4d who presents for routine prenatal visit.     1. 31 weeks gestation of pregnancy  -     CBC and differential; Future  -     Type and screen; Future  -     RPR-Syphilis Screening (Total Syphilis IGG/IGM); Future  -     Anemia Panel w/Reflex, OB; Future  -     Glucose, 1H PG; Future  -     Chlamydia/GC amplified DNA by PCR  -     Ambulatory Referral to Maternal Fetal Medicine; Future; Expected date: 2024    2. Encounter for immunization  -     Tdap Vaccine greater than or equal to 8yo    3. 32 weeks gestation of pregnancy    4. Abnormal glucose affecting pregnancy  Assessment & Plan:  3 hour ordered       5. Fetal macrosomia affecting management of mother, antepartum  Assessment & Plan:  Previous baby was 8.2      6. History of shoulder dystocia in prior pregnancy, currently pregnant in third trimester  Assessment & Plan:  Pt is not sure if she wants  a c section   She is very interested in   Will consider IOL at 39 weeks       7. Thrombocytopenia affecting pregnancy (HCC)  Assessment & Plan:  Lab Results   Component Value Date     (L) 2024           8. Obesity in pregnancy, antepartum  Assessment & Plan:  Starting BMI is 49  Will need to start NST next visit     Growth in 3rd trimester   Next growth is scheduled on  - 5/10  Nst are already all scheduled with them             Subjective:     CC: Prenatal care    Ramez Scott is a 28 y.o.  female who presents for routine prenatal care at 33w4d.  Pregnancy ROS: no leakage of fluid, pelvic pain, or vaginal bleeding.  yes fetal movement.    The following portions of the patient's history were reviewed and updated as appropriate: allergies, current medications, past family history, past medical history, obstetric history, gynecologic history, past social history, past surgical  "history and problem list.      Objective:  /64   Ht 5' 2\" (1.575 m)   Wt 129 kg (284 lb 6.4 oz)   LMP 2023 (Exact Date)   BMI 52.02 kg/m²   Pregravid Weight/BMI: 122 kg (270 lb) (BMI 49.37)  Current Weight: 129 kg (284 lb 6.4 oz)   Total Weight Gain: 6.532 kg (14 lb 6.4 oz)   Pre- Vitals      Flowsheet Row Most Recent Value   Prenatal Assessment    Prenatal Vitals    Blood Pressure 115/64   Weight - Scale 129 kg (284 lb 6.4 oz)   Urine Albumin/Glucose    Dilation/Effacement/Station    Vaginal Drainage    Edema              General: Well appearing, no distress  Respiratory: Unlabored breathing  Cardiovascular: Regular rate.  Abdomen: Soft, gravid, nontender  Fundal Height: Appropriate for gestational age.  Extremities: Warm and well perfused.  Non tender.    "

## 2024-05-06 NOTE — PROGRESS NOTES
Routine Prenatal Visit  [facility] [address] [place]    Assessment/Plan:  Ramez is a 28 y.o. year old  at 33w4d who presents for routine prenatal visit.     1. History of shoulder dystocia in prior pregnancy, currently pregnant in third trimester  Assessment & Plan:  - Patient reports that she was not debriefed about the shoulder dystocia after delivery but was only informed about it at the beginning of this pregnancy on review of her delivery records  - We discussed that shoulder dystocia can be difficult to predict.  We discussed her specific risk factors for shoulder dystocia including obesity, gestational diabetes, and history of shoulder dystocia for an 8 lb 2oz baby.  Her pelvis is proven to 8lb 8oz.  - We discussed how a shoulder dystocia is managed with the typical maneuvers (Edgar, suprapubic pressure, rotational maneuvers, delivery of the posterior arm) and in rare instances desperation maneuvers are required such as clavicular fracture, abdominal rescue, or Zavenelli maneuver.   - We discussed that while 95% of shoulder dystocias are not associated with injury to the , injury to the  can include transient brachial plexus injury (3-16%), clavicular fracture (1-9%), humerus fracture (0.1-4%), permanent brachial plexus palsy (0.5-1.6%), hypoxic-ischemic encephalopathy (0.3%), or death (0.35%).   - We reviewed that when the estimated fetal weight exceeds 4500 grams in a mom with diabetes or 5000 grams in a mom without diabetes, the recommendation is to consider  delivery to reduce the risk of  morbidity.  - She will have an updated growth ultrasound at the beginning of .   At this point in time she would prefer to have a vaginal birth, understands the risk of shoulder recurrence, and opts to defer this decision later in the pregnancy.      2. Fetal macrosomia affecting management of mother, antepartum    3. Diet controlled  "gestational diabetes mellitus (GDM) in third trimester  Assessment & Plan:  - She recently obtained a glucometer and has taken 1 of 2 GDM classes.  She is uploading her glucoses to [employer].      4. 33 weeks gestation of pregnancy  Assessment & Plan:  - We briefly discussed contraceptive options including long-acting reversible contraception and permanent sterilization. She is considering these options.       5. Obesity in pregnancy, antepartum  Assessment & Plan:  - Scheduled for weekly NST/DAGO and repeat growth US            Subjective:     CC: Prenatal care    Ramez Scott is a 28 y.o.  female who presents for routine prenatal care at 33w4d.  Pregnancy ROS: Denies leakage of fluid, pelvic pain, or vaginal bleeding.  Reports normal fetal movement.    The following portions of the patient's history were reviewed and updated as appropriate: allergies, current medications, past family history, past medical history, obstetric history, gynecologic history, past social history, past surgical history and problem list.      Objective:  /70   Ht 5' 2\" (1.575 m)   Wt 132 kg (290 lb)   LMP 2023 (Exact Date)   BMI 53.04 kg/m²   Pregravid Weight/BMI: 122 kg (270 lb) (BMI 49.37)  Current Weight: 132 kg (290 lb)   Total Weight Gain: 9.072 kg (20 lb)   Pre- Vitals      Flowsheet Row Most Recent Value   Prenatal Assessment    Fetal Heart Rate 145   Movement Present   Prenatal Vitals    Blood Pressure 124/70   Weight - Scale 132 kg (290 lb)   Urine Albumin/Glucose    Dilation/Effacement/Station    Vaginal Drainage    Draining Fluid No   Edema    LLE Edema None   RLE Edema None   Facial Edema None             General: Well appearing, no distress  Respiratory: Unlabored breathing  Cardiovascular: Regular rate.  Abdomen: Soft, gravid, nontender  Fundal Height: Appropriate for gestational age.  Extremities: Warm and well perfused.  Non tender.    Rosana Perez MD  [facility]  [facility] " [facility]  5/6/2024 1:40 PM

## 2024-05-06 NOTE — ASSESSMENT & PLAN NOTE
- Patient reports that she was not debriefed about the shoulder dystocia after delivery but was only informed about it at the beginning of this pregnancy on review of her delivery records  - We discussed that shoulder dystocia can be difficult to predict.  We discussed her specific risk factors for shoulder dystocia including obesity, gestational diabetes, and history of shoulder dystocia for an 8 lb 2oz baby.  Her pelvis is proven to 8lb 8oz.  - We discussed how a shoulder dystocia is managed with the typical maneuvers (Edgar, suprapubic pressure, rotational maneuvers, delivery of the posterior arm) and in rare instances desperation maneuvers are required such as clavicular fracture, abdominal rescue, or Zavenelli maneuver.   - We discussed that while 95% of shoulder dystocias are not associated with injury to the , injury to the  can include transient brachial plexus injury (3-16%), clavicular fracture (1-9%), humerus fracture (0.1-4%), permanent brachial plexus palsy (0.5-1.6%), hypoxic-ischemic encephalopathy (0.3%), or death (0.35%).   - We reviewed that when the estimated fetal weight exceeds 4500 grams in a mom with diabetes or 5000 grams in a mom without diabetes, the recommendation is to consider  delivery to reduce the risk of  morbidity.  - She will have an updated growth ultrasound at the beginning of .   At this point in time she would prefer to have a vaginal birth, understands the risk of shoulder recurrence, and opts to defer this decision later in the pregnancy.

## 2024-05-10 ENCOUNTER — TELEMEDICINE (OUTPATIENT)
Dept: PERINATAL CARE | Facility: CLINIC | Age: 29
End: 2024-05-10
Payer: COMMERCIAL

## 2024-05-10 DIAGNOSIS — Z3A.34 34 WEEKS GESTATION OF PREGNANCY: ICD-10-CM

## 2024-05-10 DIAGNOSIS — O24.410 DIET CONTROLLED GESTATIONAL DIABETES MELLITUS (GDM) IN THIRD TRIMESTER: Primary | ICD-10-CM

## 2024-05-10 PROCEDURE — G0109 DIAB MANAGE TRN IND/GROUP: HCPCS

## 2024-05-10 NOTE — PROGRESS NOTES
"CLASS 2 - Group  (virtual visit)    Thank you for referring your patient to [facility] Maternal Fetal Medicine Diabetes and Pregnancy Program.     Ramez Scott is a  28 y.o. female who presents today unaccompanied for Virtual Regular Visit, Patient Education, and Gestational Diabetes.  Patient is at 34w1d gestation, Estimated Date of Delivery: 6/20/24.     Visit Diagnosis:  Encounter Diagnosis     ICD-10-CM    1. Diet controlled gestational diabetes mellitus (GDM) in third trimester  O24.410       2. 34 weeks gestation of pregnancy  Z3A.34            Reviewed and updated the following from patients medical record: PMH, Problem List, Allergies, and Current Medications.    Labs  GDM LABS: See Class 1 Note    A1C:  Lab Results   Component Value Date/Time    HGBA1C 5.4 12/01/2023 12:00 AM    HGBA1C 5.4 12/01/2023 12:00 AM        Labs Ordered This Visit: None    Current Medications:    Current Outpatient Medications:     Blood Glucose Monitoring Suppl (OneTouch Verio Flex System) w/Device KIT, Test x4 Daily or as instructed, Disp: 1 kit, Rfl: 0    cetirizine (ZyrTEC) 10 MG chewable tablet, Chew 10 mg daily, Disp: , Rfl:     famotidine (PEPCID) 20 mg tablet, Take 20 mg by mouth 2 (two) times a day (Patient not taking: Reported on 5/1/2024), Disp: , Rfl:     ondansetron (ZOFRAN) 4 mg tablet, Take 1 tablet (4 mg total) by mouth every 12 (twelve) hours as needed for nausea or vomiting for up to 7 days (Patient not taking: Reported on 4/24/2024), Disp: 14 tablet, Rfl: 0    OneTouch Delica Lancets 33G MISC, Use 4 a Day or as instructed, Disp: 100 each, Rfl: 3    OneTouch Verio test strip, Test 4 Times Daily or as instructed, Disp: 100 strip, Rfl: 3    Prenatal MV-Min-Fe Fum-FA-DHA (PRENATAL 1 PO), Take by mouth, Disp: , Rfl:     ranitidine (ZANTAC) 150 MG capsule, Take 150 mg by mouth 2 (two) times a day, Disp: , Rfl:      Anthropometrics:  Ht Readings from Last 1 Encounters:   05/06/24 5' 2\" (1.575 m)      Wt Readings from " Last 3 Encounters:   24 132 kg (290 lb)   24 131 kg (288 lb 6.4 oz)   24 131 kg (288 lb 12.8 oz)        Pre-Gravid Wt Pre-Gravid BMI TWG   122 kg (270 lb) 49.37 9.072 kg (20 lb)     Total Pregnancy Weight Gain Recommendations: BMI (> 30) 11-20 lbs  Current Wt Status Compared to Recommendations: Exceeding -- Recommended to maintain wt for remainder of pregnancy    Most Recent Ultrasound Results:  Findings: NML Growth/DAGO  Further Fetal Surveillance: None  Next US date: Scheduled Appropriately    BLOOD GLUCOSE MONITORING:   Glucometer: OneTouch Verio Flex not yet picked up d/t pharmacy out of stock. Using verio platinum.  - Encouraged pt to reach out to pharmacy    Reinforced at Today's Visit:   Timing/Frequency of SMB x per day (Fasting, 2 hour after start of each meal)  Goals: (Fasting) 60-95mg/dL // (2hr PP) <120mg/dL  Reporting Guidelines: Weekly via Phone: [phone] OR My Chart (Message with image attachment) OR Glucose Flowsheet  Method of Reporting: [employer] Glucose Flowsheet    BG LOG:         Review of Blood Glucose Log:   Indicates excellent glycemic control  FBG = Well controlled  Post-Prandial BG = Well controlled    MEAL PLAN (Patient was provided with a meal plan including 3 meals and 3 snacks at class 1)  *Calories: 1800 calorie (CHO: 45-15-45-15-45-30) (PRO:2-1-3-1-3-2)    Reinforced Diet Instructions:  Individualized meal plan.   Importance of consistent carbohydrate intake via 3 meals and 3 snacks per day   Importance of protein as it relates to blood glucose control.   Encouraged  patient to eat every 2.0-3.5 hours while awake  Encouraged patient to go no longer than 8-10 hours fasting overnight until first meal of the day.  Provided suggested meal/snack options to increase nutrition and maintain consistent meal and snack intakes.    Physical Activity:    Reviewed w/ Pt:   Benefits of physical activity to optimize blood glucose control, encouraged activity at patient is  "physically able.   Instructed pt to always consult a physician prior to starting an exercise program.   Recommend 20-30 minutes daily.    Additional Topics Reviewed:    Medications: (reviewed options available with pt)  Discussed if blood sugars are not within normal range with meal planning and exercise  Reviewed medication such as metformin and/or basal/bolus insulin may be needed for better glucose control  Maternal-Fetal Testing:   Ultrasounds: growth scans every 4 weeks.  NST: twice weekly starting at 32nd week GA  DAGO:  weekly starting at 32 weeks GA  Sick day Guidelines:   Advised that sickness will raise blood sugar   If blood sugar is > 160 mg/dL twice in one day call doctor  If on diabetes medications, continue as instructed   If unable to consume normal meal plan, instructed to remain well hydrated   Hypoglycemia & Treatment Guidelines:  Reviewed what hypoglycemia is, signs and symptoms, and how to treat via the 15:15 rule.  Post-Partum Guidelines:  Completion of 75 gm CHO 2 hr gtt at 6 weeks post-partum to check for Type 2 DM diagnosis  Breastfeeding Guidelines:  Continue GDM meal plan plus additional 350-500 calories daily  Examples of protein and carbohydrate snacks provided.  Stay hydrated by drinking 8-10 (8 oz.) fluids daily.  Dining Out & Travel Guidelines:  Patient advised to be prepared with extra diabetes supplies, medications, and snacks, as well as sticking to the same time schedule and portions eaten at home for meals and snacks.    Patient Stated Goal: \"I will eat 3 meals and 3 snacks each day, including protein at each\"  Goal Assessment: On track    Diabetes Self Management Support Plan outside of ongoing care: Spouse/Family    Barriers to Learning/Change: No Barriers  Expected Compliance: good    Date to report blood sugars: Weekly   Follow up:  Return per weekly review of blood sugar log.     Begin Time: 9:00am  End Time: 10:00am    It was a pleasure working with them today. Please feel " free to call [phone]) with any questions or concerns.    Abi Hook RD   Diabetes Educator  [facility] Maternal Fetal Medicine  Diabetes and Pregnancy Program  [address], [address]  [place] [address]    Virtual Regular Visit    Verification of patient location:    Patient is located at Home in the following state in which I hold an active license [place]      Assessment/Plan:    Problem List Items Addressed This Visit       33 weeks gestation of pregnancy    Diet controlled gestational diabetes mellitus (GDM) in third trimester - Primary            Reason for visit is   Chief Complaint   Patient presents with    Virtual Regular Visit    Patient Education    Gestational Diabetes          Encounter provider Abi Hook RD      Recent Visits  Date Type Provider Dept   24 Telemedicine Abi Hook RD [facility]   Showing recent visits within past 7 days and meeting all other requirements  Today's Visits  Date Type Provider Dept   05/10/24 Telemedicine Abi Hook RD [facility]   Showing today's visits and meeting all other requirements  Future Appointments  No visits were found meeting these conditions.  Showing future appointments within next 150 days and meeting all other requirements       The patient was identified by name and date of birth. Ramez Scott was informed that this is a telemedicine visit and that the visit is being conducted through the [employer] platform. She agrees to proceed..  My office door was closed. No one else was in the room.  She acknowledged consent and understanding of privacy and security of the video platform. The patient has agreed to participate and understands they can discontinue the visit at any time.    Patient is aware this is a billable service.     Subjective  Ramez Scott is a 28 y.o. female pregnant.      HPI     Past Medical History:   Diagnosis Date    Chlamydia     in the past    Varicella     had vaccine       Past Surgical  History:   Procedure Laterality Date    FRACTURE SURGERY  2015    right hand       Current Outpatient Medications   Medication Sig Dispense Refill    Blood Glucose Monitoring Suppl (OneTouch Verio Flex System) w/Device KIT Test x4 Daily or as instructed 1 kit 0    cetirizine (ZyrTEC) 10 MG chewable tablet Chew 10 mg daily      famotidine (PEPCID) 20 mg tablet Take 20 mg by mouth 2 (two) times a day (Patient not taking: Reported on 5/1/2024)      ondansetron (ZOFRAN) 4 mg tablet Take 1 tablet (4 mg total) by mouth every 12 (twelve) hours as needed for nausea or vomiting for up to 7 days (Patient not taking: Reported on 4/24/2024) 14 tablet 0    OneTouch Delica Lancets 33G MISC Use 4 a Day or as instructed 100 each 3    OneTouch Verio test strip Test 4 Times Daily or as instructed 100 strip 3    Prenatal MV-Min-Fe Fum-FA-DHA (PRENATAL 1 PO) Take by mouth      ranitidine (ZANTAC) 150 MG capsule Take 150 mg by mouth 2 (two) times a day       No current facility-administered medications for this visit.        No Known Allergies    Review of Systems    Video Exam    There were no vitals filed for this visit.    Physical Exam     Visit Time  Total Visit Duration: 60min

## 2024-05-14 ENCOUNTER — TELEPHONE (OUTPATIENT)
Facility: HOSPITAL | Age: 29
End: 2024-05-14

## 2024-05-14 NOTE — TELEPHONE ENCOUNTER
Left voicemail informing patient of the change for her upcoming Maternal Fetal Medicine appointment on 5/15. Requested she give our office a call back at [phone] with any questions or if she would need to reschedule.

## 2024-05-15 ENCOUNTER — ULTRASOUND (OUTPATIENT)
Dept: PERINATAL CARE | Facility: OTHER | Age: 29
End: 2024-05-15
Payer: COMMERCIAL

## 2024-05-15 VITALS
DIASTOLIC BLOOD PRESSURE: 60 MMHG | WEIGHT: 290.6 LBS | BODY MASS INDEX: 53.47 KG/M2 | HEIGHT: 62 IN | SYSTOLIC BLOOD PRESSURE: 112 MMHG | HEART RATE: 75 BPM

## 2024-05-15 DIAGNOSIS — O99.210 OBESITY IN PREGNANCY, ANTEPARTUM: Primary | ICD-10-CM

## 2024-05-15 DIAGNOSIS — Z3A.34 34 WEEKS GESTATION OF PREGNANCY: ICD-10-CM

## 2024-05-15 DIAGNOSIS — O99.810 ABNORMAL GLUCOSE AFFECTING PREGNANCY: ICD-10-CM

## 2024-05-15 PROCEDURE — 59025 FETAL NON-STRESS TEST: CPT | Performed by: OBSTETRICS & GYNECOLOGY

## 2024-05-15 PROCEDURE — 76815 OB US LIMITED FETUS(S): CPT | Performed by: OBSTETRICS & GYNECOLOGY

## 2024-05-15 NOTE — LETTER
May 15, 2024     JERALD Anderson  [address]  [address]  [place] [facility] [id number]    Patient: Ramez Scott   YOB: 1995   Date of Visit: 5/15/2024       Dear Dr. Herrera:    Thank you for referring Ramez Scott to me for evaluation. Below are my notes for this consultation.    If you have questions, please do not hesitate to call me. I look forward to following your patient along with you.         Sincerely,        Bridgette Solomon MD        CC: No Recipients    Bridgette Solomon MD  5/15/2024 12:27 PM  Sign when Signing Visit  NST is reactive. Bridgette Solomon MD

## 2024-05-15 NOTE — PROGRESS NOTES
Repeat Non-Stress Testing:    Patient verbalizes +FM. Pt denies ALL:               Leaking of fluid   Contractions   Vaginal bleeding   Decreased fetal movement    Patient is performing daily kick counts. Patient has no questions or concerns.   NST strip reviewed by Dr. Solomon.

## 2024-05-16 PROBLEM — Z3A.35 35 WEEKS GESTATION OF PREGNANCY: Status: ACTIVE | Noted: 2024-05-01

## 2024-05-20 ENCOUNTER — ROUTINE PRENATAL (OUTPATIENT)
Dept: OBGYN CLINIC | Facility: MEDICAL CENTER | Age: 29
End: 2024-05-20
Payer: COMMERCIAL

## 2024-05-20 ENCOUNTER — TELEPHONE (OUTPATIENT)
Dept: OBGYN CLINIC | Facility: CLINIC | Age: 29
End: 2024-05-20

## 2024-05-20 VITALS — WEIGHT: 293 LBS | DIASTOLIC BLOOD PRESSURE: 68 MMHG | BODY MASS INDEX: 53.59 KG/M2 | SYSTOLIC BLOOD PRESSURE: 124 MMHG

## 2024-05-20 DIAGNOSIS — O99.119 THROMBOCYTOPENIA AFFECTING PREGNANCY (HCC): ICD-10-CM

## 2024-05-20 DIAGNOSIS — D69.6 THROMBOCYTOPENIA AFFECTING PREGNANCY (HCC): ICD-10-CM

## 2024-05-20 DIAGNOSIS — O99.210 OBESITY IN PREGNANCY, ANTEPARTUM: ICD-10-CM

## 2024-05-20 DIAGNOSIS — Z3A.35 35 WEEKS GESTATION OF PREGNANCY: ICD-10-CM

## 2024-05-20 DIAGNOSIS — O09.293 HISTORY OF SHOULDER DYSTOCIA IN PRIOR PREGNANCY, CURRENTLY PREGNANT IN THIRD TRIMESTER: ICD-10-CM

## 2024-05-20 DIAGNOSIS — O36.60X0 FETAL MACROSOMIA AFFECTING MANAGEMENT OF MOTHER, ANTEPARTUM: ICD-10-CM

## 2024-05-20 DIAGNOSIS — O24.410 DIET CONTROLLED GESTATIONAL DIABETES MELLITUS (GDM) IN THIRD TRIMESTER: Primary | ICD-10-CM

## 2024-05-20 DIAGNOSIS — O99.810 ABNORMAL GLUCOSE AFFECTING PREGNANCY: ICD-10-CM

## 2024-05-20 PROCEDURE — 99213 OFFICE O/P EST LOW 20 MIN: CPT | Performed by: NURSE PRACTITIONER

## 2024-05-20 PROCEDURE — 87150 DNA/RNA AMPLIFIED PROBE: CPT | Performed by: NURSE PRACTITIONER

## 2024-05-20 NOTE — TELEPHONE ENCOUNTER
Added DAGO's to future appts, as per Lucita.        ----- Message from JERALD Hercules sent at 5/20/2024  1:40 PM EDT -----  FYI - pt needed to be scheduled for NST/ DAGO. Not just NST. Luckily she kept her appt for PNC

## 2024-05-20 NOTE — PROGRESS NOTES
Denies loss of fluid, vaginal bleeding and regular contractions.  Confirms frequent fetal movement.  Doing fetal kick counts.  Tolerating prenatal vitamin well.  Fasting blood sugars 84-97 and 2-hour postprandials .  Has been a little inconsistent with taking blood sugars due to busy schedule.  Patient states she has just been uncomfortable over the past 2 days.  Has had some nausea no vomiting.  Some loose stools but not increased in frequency.  Is also having a difficult time sleeping . is interested in membrane stripping once she is full-term.  Would like to avoid induction of labor  BP: 124/68 weight:+23lbs   Plan  -Continue prenatal vitamins daily  -Continue fetal kick counts daily  -Continue vaginal/perineal massage 1-4 times per week  - fetal surveillance scheduled with  center 24  -Gestational diabetes encouraged to continue checking blood sugars, following diet and close contact with diabetes and pregnancy program  -GBS collected no penicillin allergy  -Common discomforts of pregnancy and precautions reviewed.  Signs and symptoms to report reviewed.  RTO 1 week f/u pediatrician

## 2024-05-22 LAB — GP B STREP DNA SPEC QL NAA+PROBE: POSITIVE

## 2024-05-23 ENCOUNTER — ULTRASOUND (OUTPATIENT)
Dept: PERINATAL CARE | Facility: OTHER | Age: 29
End: 2024-05-23
Payer: COMMERCIAL

## 2024-05-23 VITALS
BODY MASS INDEX: 53.7 KG/M2 | HEIGHT: 62 IN | DIASTOLIC BLOOD PRESSURE: 62 MMHG | SYSTOLIC BLOOD PRESSURE: 126 MMHG | HEART RATE: 96 BPM | WEIGHT: 291.8 LBS

## 2024-05-23 DIAGNOSIS — Z3A.36 36 WEEKS GESTATION OF PREGNANCY: Primary | ICD-10-CM

## 2024-05-23 DIAGNOSIS — O99.213 MATERNAL MORBID OBESITY IN THIRD TRIMESTER, ANTEPARTUM (HCC): ICD-10-CM

## 2024-05-23 DIAGNOSIS — E66.01 MATERNAL MORBID OBESITY IN THIRD TRIMESTER, ANTEPARTUM (HCC): ICD-10-CM

## 2024-05-23 PROCEDURE — 59025 FETAL NON-STRESS TEST: CPT | Performed by: OBSTETRICS & GYNECOLOGY

## 2024-05-23 PROCEDURE — 76815 OB US LIMITED FETUS(S): CPT | Performed by: OBSTETRICS & GYNECOLOGY

## 2024-05-23 NOTE — PROGRESS NOTES
Non-Stress Testing:    Non-Stress test, equipment, procedure, and expected outcomes explained. Reviewed fetal kick counts and when to call OB.Verified patient understanding of fetal kick counts with teach back method. Patient reports feeling daily fetal movements. Patient has no questions or concerns.            Dr. Carbajal viewed NST strip prior to completion of visit

## 2024-05-31 ENCOUNTER — ROUTINE PRENATAL (OUTPATIENT)
Dept: OBGYN CLINIC | Facility: CLINIC | Age: 29
End: 2024-05-31
Payer: COMMERCIAL

## 2024-05-31 VITALS — WEIGHT: 293 LBS | DIASTOLIC BLOOD PRESSURE: 70 MMHG | SYSTOLIC BLOOD PRESSURE: 116 MMHG | BODY MASS INDEX: 53.77 KG/M2

## 2024-05-31 DIAGNOSIS — O99.210 OBESITY IN PREGNANCY, ANTEPARTUM: ICD-10-CM

## 2024-05-31 DIAGNOSIS — Z3A.37 37 WEEKS GESTATION OF PREGNANCY: ICD-10-CM

## 2024-05-31 DIAGNOSIS — O24.410 DIET CONTROLLED GESTATIONAL DIABETES MELLITUS (GDM) IN THIRD TRIMESTER: ICD-10-CM

## 2024-05-31 DIAGNOSIS — D69.6 BENIGN GESTATIONAL THROMBOCYTOPENIA IN THIRD TRIMESTER (HCC): ICD-10-CM

## 2024-05-31 DIAGNOSIS — O09.293 HISTORY OF SHOULDER DYSTOCIA IN PRIOR PREGNANCY, CURRENTLY PREGNANT IN THIRD TRIMESTER: ICD-10-CM

## 2024-05-31 DIAGNOSIS — Z34.93 THIRD TRIMESTER PREGNANCY: Primary | ICD-10-CM

## 2024-05-31 DIAGNOSIS — O36.60X0 FETAL MACROSOMIA AFFECTING MANAGEMENT OF MOTHER, ANTEPARTUM: ICD-10-CM

## 2024-05-31 DIAGNOSIS — O99.113 BENIGN GESTATIONAL THROMBOCYTOPENIA IN THIRD TRIMESTER (HCC): ICD-10-CM

## 2024-05-31 PROCEDURE — 76815 OB US LIMITED FETUS(S): CPT | Performed by: OBSTETRICS & GYNECOLOGY

## 2024-05-31 PROCEDURE — 59025 FETAL NON-STRESS TEST: CPT | Performed by: OBSTETRICS & GYNECOLOGY

## 2024-05-31 PROCEDURE — 99214 OFFICE O/P EST MOD 30 MIN: CPT | Performed by: OBSTETRICS & GYNECOLOGY

## 2024-05-31 NOTE — PROGRESS NOTES
Assessment  28 y.o.  at 37w1d presenting for routine prenatal visit.     Plan  Diagnoses and all orders for this visit:    Third trimester pregnancy  37 weeks gestation of pregnancy  - Labor precautions  - FKC  - Return in 1wk for PN    Benign gestational thrombocytopenia in third trimester (HCC)  -     CBC and Platelet; Standing    Diet controlled gestational diabetes mellitus (GDM) in third trimester  Obesity in pregnancy, antepartum  History of shoulder dystocia in prior pregnancy, currently pregnant in third trimester  Fetal macrosomia affecting management of mother, antepartum  - Follows with MFM for growth  - Well counseled on risk of recurrence for SD, risk factors for same. Expresses understanding of difficulty predicting occurrence with certainty and option for . Plans vaginal delivery  - Would like to avoid IOL if able; prefers to attempt membrane sweep instead. Can consider next visit  - Task sent for IOL; agreeable if sweeps not effective  - Continue glucose checking and reporting  - Ongoing APFS    ____________________________________________________________        Subjective    Ramez Scott is a 28 y.o.  at 37w1d who presents for routine prenatal visit. She is reporting pelvic pressure, baseline irregular cramping. Denies contractions, loss of fluid, or vaginal bleeding. She feels regular fetal movements.     Pregnancy Problems:  Patient Active Problem List   Diagnosis    Obesity in pregnancy, antepartum    History of shoulder dystocia in prior pregnancy, currently pregnant in third trimester    Abnormal glucose affecting pregnancy    35 weeks gestation of pregnancy    Fetal macrosomia affecting management of mother, antepartum    Thrombocytopenia affecting pregnancy (HCC)    Diet controlled gestational diabetes mellitus (GDM) in third trimester         Objective  /70   Wt 133 kg (294 lb)   LMP 2023 (Exact Date)   BMI 53.77 kg/m²     NST:  DAGO: 140 BPM / moderate /  +accels  14.7cm   Uterine Size: size equals dates   Presentations: cephalic   Pelvic Exam:     Dilation: 2cm    Effacement: 50%    Station:  -2    Consistency: soft    Position: middle     Physical Exam:  Physical Exam  Constitutional:       General: She is not in acute distress.     Appearance: Normal appearance. She is well-developed. She is not ill-appearing, toxic-appearing or diaphoretic.   HENT:      Head: Normocephalic and atraumatic.   Eyes:      General: No scleral icterus.        Right eye: No discharge.         Left eye: No discharge.      Conjunctiva/sclera: Conjunctivae normal.   Pulmonary:      Effort: Pulmonary effort is normal. No accessory muscle usage or respiratory distress.   Abdominal:      General: There is distension (gravid).      Tenderness: There is no abdominal tenderness. There is no guarding or rebound.   Skin:     General: Skin is warm and dry.      Coloration: Skin is not jaundiced.      Findings: No bruising, erythema or rash.   Neurological:      Mental Status: She is alert.   Psychiatric:         Mood and Affect: Mood normal.         Behavior: Behavior normal.         Thought Content: Thought content normal.         Judgment: Judgment normal.

## 2024-06-03 ENCOUNTER — APPOINTMENT (OUTPATIENT)
Dept: LAB | Facility: CLINIC | Age: 29
End: 2024-06-03
Payer: COMMERCIAL

## 2024-06-03 DIAGNOSIS — O99.113 BENIGN GESTATIONAL THROMBOCYTOPENIA IN THIRD TRIMESTER (HCC): ICD-10-CM

## 2024-06-03 DIAGNOSIS — D69.6 BENIGN GESTATIONAL THROMBOCYTOPENIA IN THIRD TRIMESTER (HCC): ICD-10-CM

## 2024-06-03 LAB
ERYTHROCYTE [DISTWIDTH] IN BLOOD BY AUTOMATED COUNT: 13.2 % (ref 11.6–15.1)
HCT VFR BLD AUTO: 33.4 % (ref 34.8–46.1)
HGB BLD-MCNC: 10.7 G/DL (ref 11.5–15.4)
MCH RBC QN AUTO: 27.4 PG (ref 26.8–34.3)
MCHC RBC AUTO-ENTMCNC: 32 G/DL (ref 31.4–37.4)
MCV RBC AUTO: 86 FL (ref 82–98)
PLATELET # BLD AUTO: 121 THOUSANDS/UL (ref 149–390)
PMV BLD AUTO: 13.6 FL (ref 8.9–12.7)
RBC # BLD AUTO: 3.9 MILLION/UL (ref 3.81–5.12)
WBC # BLD AUTO: 9.55 THOUSAND/UL (ref 4.31–10.16)

## 2024-06-03 PROCEDURE — 85027 COMPLETE CBC AUTOMATED: CPT

## 2024-06-03 PROCEDURE — 36415 COLL VENOUS BLD VENIPUNCTURE: CPT

## 2024-06-04 ENCOUNTER — TELEPHONE (OUTPATIENT)
Dept: OBGYN CLINIC | Facility: MEDICAL CENTER | Age: 29
End: 2024-06-04

## 2024-06-04 ENCOUNTER — ULTRASOUND (OUTPATIENT)
Dept: PERINATAL CARE | Facility: OTHER | Age: 29
End: 2024-06-04
Payer: COMMERCIAL

## 2024-06-04 VITALS
BODY MASS INDEX: 53.92 KG/M2 | HEIGHT: 62 IN | HEART RATE: 83 BPM | WEIGHT: 293 LBS | SYSTOLIC BLOOD PRESSURE: 118 MMHG | DIASTOLIC BLOOD PRESSURE: 70 MMHG

## 2024-06-04 DIAGNOSIS — O24.410 DIET CONTROLLED GESTATIONAL DIABETES MELLITUS (GDM) IN THIRD TRIMESTER: Primary | ICD-10-CM

## 2024-06-04 DIAGNOSIS — Z3A.37 37 WEEKS GESTATION OF PREGNANCY: ICD-10-CM

## 2024-06-04 DIAGNOSIS — O36.60X0 FETAL MACROSOMIA AFFECTING MANAGEMENT OF MOTHER, ANTEPARTUM: ICD-10-CM

## 2024-06-04 DIAGNOSIS — O99.210 OBESITY IN PREGNANCY, ANTEPARTUM: ICD-10-CM

## 2024-06-04 PROCEDURE — 99213 OFFICE O/P EST LOW 20 MIN: CPT | Performed by: OBSTETRICS & GYNECOLOGY

## 2024-06-04 PROCEDURE — 76816 OB US FOLLOW-UP PER FETUS: CPT | Performed by: OBSTETRICS & GYNECOLOGY

## 2024-06-04 PROCEDURE — 76818 FETAL BIOPHYS PROFILE W/NST: CPT | Performed by: OBSTETRICS & GYNECOLOGY

## 2024-06-04 NOTE — TELEPHONE ENCOUNTER
Patient scheduled for IOL 6/13 at 0600. Patient made aware.    ----- Message from Isabel Dooley MD sent at 5/31/2024  4:47 PM EDT -----  Regarding: iol  Please schedule Ramez for IOL in her 39th week for A1GDM, macrosomia, hx shoulder dystocia.

## 2024-06-04 NOTE — PROGRESS NOTES
Repeat Non-Stress Testing:    Patient verbalizes +FM. Pt denies ALL:               Leaking of fluid   Contractions   Vaginal bleeding   Decreased fetal movement    Patient is performing daily kick counts. Patient has no questions or concerns.   NST strip reviewed by Dr. Fofana.

## 2024-06-05 ENCOUNTER — ROUTINE PRENATAL (OUTPATIENT)
Dept: OBGYN CLINIC | Facility: MEDICAL CENTER | Age: 29
End: 2024-06-05
Payer: COMMERCIAL

## 2024-06-05 VITALS
HEIGHT: 62 IN | SYSTOLIC BLOOD PRESSURE: 110 MMHG | DIASTOLIC BLOOD PRESSURE: 60 MMHG | HEART RATE: 99 BPM | BODY MASS INDEX: 53.92 KG/M2 | WEIGHT: 293 LBS

## 2024-06-05 DIAGNOSIS — O09.293 HISTORY OF SHOULDER DYSTOCIA IN PRIOR PREGNANCY, CURRENTLY PREGNANT IN THIRD TRIMESTER: ICD-10-CM

## 2024-06-05 DIAGNOSIS — O24.410 DIET CONTROLLED GESTATIONAL DIABETES MELLITUS (GDM) IN THIRD TRIMESTER: ICD-10-CM

## 2024-06-05 DIAGNOSIS — Z3A.37 37 WEEKS GESTATION OF PREGNANCY: Primary | ICD-10-CM

## 2024-06-05 DIAGNOSIS — O99.810 ABNORMAL GLUCOSE AFFECTING PREGNANCY: ICD-10-CM

## 2024-06-05 DIAGNOSIS — O99.210 OBESITY IN PREGNANCY, ANTEPARTUM: ICD-10-CM

## 2024-06-05 PROCEDURE — 99214 OFFICE O/P EST MOD 30 MIN: CPT | Performed by: OBSTETRICS & GYNECOLOGY

## 2024-06-05 PROCEDURE — 59025 FETAL NON-STRESS TEST: CPT | Performed by: OBSTETRICS & GYNECOLOGY

## 2024-06-05 NOTE — PROGRESS NOTES
Ramez is a 28 y.o. year old  at 37w6d for routine prenatal visit.   + FM, no vaginal bleeding, contractions, or LOF  Complaints: No   Most recent ultrasound and labs reviewed.  [facility] reviewed   Membranes stripped   per patient request - 2.5/-3   She is scheduled  for IOL on     Hx of previous shoulder dystocia and possible macrosomia this pregnancy    - has been counseled previously and again by me today re risks and possible complications - offered her C/S and she desires for trial of labor   Elevated BMI / A1GDM - NST today  reactive - Had DAGO at [facility] yesterday   See NST tab

## 2024-06-11 ENCOUNTER — TELEPHONE (OUTPATIENT)
Dept: OTHER | Facility: OTHER | Age: 29
End: 2024-06-11

## 2024-06-13 ENCOUNTER — HOSPITAL ENCOUNTER (INPATIENT)
Facility: HOSPITAL | Age: 29
LOS: 1 days | Discharge: HOME | End: 2024-06-14
Attending: STUDENT IN AN ORGANIZED HEALTH CARE EDUCATION/TRAINING PROGRAM | Admitting: OBSTETRICS & GYNECOLOGY
Payer: COMMERCIAL

## 2024-06-13 ENCOUNTER — ANESTHESIA (INPATIENT)
Dept: ANESTHESIOLOGY | Facility: HOSPITAL | Age: 29
End: 2024-06-13
Payer: COMMERCIAL

## 2024-06-13 ENCOUNTER — ANESTHESIA EVENT (INPATIENT)
Dept: ANESTHESIOLOGY | Facility: HOSPITAL | Age: 29
End: 2024-06-13
Payer: COMMERCIAL

## 2024-06-13 ENCOUNTER — HOSPITAL ENCOUNTER (OUTPATIENT)
Dept: LABOR AND DELIVERY | Facility: HOSPITAL | Age: 29
End: 2024-06-13
Payer: COMMERCIAL

## 2024-06-13 DIAGNOSIS — Z86.32 HISTORY OF GESTATIONAL DIABETES MELLITUS (GDM), NOT CURRENTLY PREGNANT: ICD-10-CM

## 2024-06-13 DIAGNOSIS — Z13.1 DIABETES MELLITUS SCREENING: Primary | ICD-10-CM

## 2024-06-13 DIAGNOSIS — Z3A.39 39 WEEKS GESTATION OF PREGNANCY: ICD-10-CM

## 2024-06-13 PROBLEM — Z34.90 ENCOUNTER FOR INDUCTION OF LABOR: Status: ACTIVE | Noted: 2024-06-13

## 2024-06-13 LAB
ABO GROUP BLD: NORMAL
BASE EXCESS BLDCOA CALC-SCNC: -6.7 MMOL/L (ref 3–11)
BASE EXCESS BLDCOV CALC-SCNC: -5.6 MMOL/L (ref 1–9)
BLD GP AB SCN SERPL QL: NEGATIVE
ERYTHROCYTE [DISTWIDTH] IN BLOOD BY AUTOMATED COUNT: 13.1 % (ref 11.6–15.1)
GLUCOSE SERPL-MCNC: 64 MG/DL (ref 65–140)
GLUCOSE SERPL-MCNC: 71 MG/DL (ref 65–140)
GLUCOSE SERPL-MCNC: 74 MG/DL (ref 65–140)
HCO3 BLDCOA-SCNC: 21.8 MMOL/L (ref 17.3–27.3)
HCO3 BLDCOV-SCNC: 19.4 MMOL/L (ref 12.2–28.6)
HCT VFR BLD AUTO: 32.1 % (ref 34.8–46.1)
HGB BLD-MCNC: 10.6 G/DL (ref 11.5–15.4)
HOLD SPECIMEN: YES
MCH RBC QN AUTO: 26.9 PG (ref 26.8–34.3)
MCHC RBC AUTO-ENTMCNC: 33 G/DL (ref 31.4–37.4)
MCV RBC AUTO: 82 FL (ref 82–98)
O2 CT VFR BLDCOA CALC: 9.9 ML/DL
OXYHGB MFR BLDCOA: 38.5 %
OXYHGB MFR BLDCOV: 64.7 %
PCO2 BLDCOA: 54.3 MM[HG] (ref 30–60)
PCO2 BLDCOV: 37.3 MM HG (ref 27–43)
PH BLDCOA: 7.22 [PH] (ref 7.23–7.43)
PH BLDCOV: 7.33 [PH] (ref 7.19–7.49)
PLATELET # BLD AUTO: 92 THOUSANDS/UL (ref 149–390)
PMV BLD AUTO: 13.5 FL (ref 8.9–12.7)
PO2 BLDCOA: 19.7 MM HG (ref 5–25)
PO2 BLDCOV: 26.9 MM HG (ref 15–45)
RBC # BLD AUTO: 3.94 MILLION/UL (ref 3.81–5.12)
RH BLD: POSITIVE
SAO2 % BLDCOV: 16.2 ML/DL
SPECIMEN EXPIRATION DATE: NORMAL
TREPONEMA PALLIDUM IGG+IGM AB [PRESENCE] IN SERUM OR PLASMA BY IMMUNOASSAY: NORMAL
WBC # BLD AUTO: 8.5 THOUSAND/UL (ref 4.31–10.16)

## 2024-06-13 PROCEDURE — NC001 PR NO CHARGE: Performed by: OBSTETRICS & GYNECOLOGY

## 2024-06-13 PROCEDURE — 82805 BLOOD GASES W/O2 SATURATION: CPT | Performed by: STUDENT IN AN ORGANIZED HEALTH CARE EDUCATION/TRAINING PROGRAM

## 2024-06-13 PROCEDURE — 85027 COMPLETE CBC AUTOMATED: CPT

## 2024-06-13 PROCEDURE — 86850 RBC ANTIBODY SCREEN: CPT | Performed by: STUDENT IN AN ORGANIZED HEALTH CARE EDUCATION/TRAINING PROGRAM

## 2024-06-13 PROCEDURE — 86900 BLOOD TYPING SEROLOGIC ABO: CPT | Performed by: STUDENT IN AN ORGANIZED HEALTH CARE EDUCATION/TRAINING PROGRAM

## 2024-06-13 PROCEDURE — 4A1HXCZ MONITORING OF PRODUCTS OF CONCEPTION, CARDIAC RATE, EXTERNAL APPROACH: ICD-10-PCS | Performed by: OBSTETRICS & GYNECOLOGY

## 2024-06-13 PROCEDURE — 86901 BLOOD TYPING SEROLOGIC RH(D): CPT | Performed by: STUDENT IN AN ORGANIZED HEALTH CARE EDUCATION/TRAINING PROGRAM

## 2024-06-13 PROCEDURE — 86780 TREPONEMA PALLIDUM: CPT

## 2024-06-13 PROCEDURE — 82948 REAGENT STRIP/BLOOD GLUCOSE: CPT

## 2024-06-13 PROCEDURE — 59409 OBSTETRICAL CARE: CPT | Performed by: OBSTETRICS & GYNECOLOGY

## 2024-06-13 PROCEDURE — 10907ZC DRAINAGE OF AMNIOTIC FLUID, THERAPEUTIC FROM PRODUCTS OF CONCEPTION, VIA NATURAL OR ARTIFICIAL OPENING: ICD-10-PCS | Performed by: OBSTETRICS & GYNECOLOGY

## 2024-06-13 RX ORDER — BUPIVACAINE HYDROCHLORIDE 2.5 MG/ML
30 INJECTION, SOLUTION EPIDURAL; INFILTRATION; INTRACAUDAL ONCE AS NEEDED
Status: DISCONTINUED | OUTPATIENT
Start: 2024-06-13 | End: 2024-06-13

## 2024-06-13 RX ORDER — ONDANSETRON 2 MG/ML
4 INJECTION INTRAMUSCULAR; INTRAVENOUS EVERY 8 HOURS PRN
Status: DISCONTINUED | OUTPATIENT
Start: 2024-06-13 | End: 2024-06-14 | Stop reason: HOSPADM

## 2024-06-13 RX ORDER — BENZOCAINE/MENTHOL 6 MG-10 MG
1 LOZENGE MUCOUS MEMBRANE DAILY PRN
Status: DISCONTINUED | OUTPATIENT
Start: 2024-06-13 | End: 2024-06-14 | Stop reason: HOSPADM

## 2024-06-13 RX ORDER — SODIUM CHLORIDE 9 MG/ML
125 INJECTION, SOLUTION INTRAVENOUS CONTINUOUS
Status: DISCONTINUED | OUTPATIENT
Start: 2024-06-13 | End: 2024-06-13

## 2024-06-13 RX ORDER — IBUPROFEN 600 MG/1
600 TABLET ORAL EVERY 6 HOURS
Status: DISCONTINUED | OUTPATIENT
Start: 2024-06-13 | End: 2024-06-14 | Stop reason: HOSPADM

## 2024-06-13 RX ORDER — ONDANSETRON 2 MG/ML
4 INJECTION INTRAMUSCULAR; INTRAVENOUS EVERY 6 HOURS PRN
Status: DISCONTINUED | OUTPATIENT
Start: 2024-06-13 | End: 2024-06-13

## 2024-06-13 RX ORDER — LIDOCAINE HYDROCHLORIDE AND EPINEPHRINE 15; 5 MG/ML; UG/ML
INJECTION, SOLUTION EPIDURAL
Status: COMPLETED | OUTPATIENT
Start: 2024-06-13 | End: 2024-06-13

## 2024-06-13 RX ORDER — CALCIUM CARBONATE 500 MG/1
1000 TABLET, CHEWABLE ORAL DAILY PRN
Status: DISCONTINUED | OUTPATIENT
Start: 2024-06-13 | End: 2024-06-14 | Stop reason: HOSPADM

## 2024-06-13 RX ORDER — CALCIUM CARBONATE 500 MG/1
1000 TABLET, CHEWABLE ORAL 2 TIMES DAILY PRN
Status: DISCONTINUED | OUTPATIENT
Start: 2024-06-13 | End: 2024-06-13

## 2024-06-13 RX ORDER — BUPIVACAINE HYDROCHLORIDE 2.5 MG/ML
30 INJECTION, SOLUTION EPIDURAL; INFILTRATION; INTRACAUDAL ONCE AS NEEDED
Status: CANCELLED | OUTPATIENT
Start: 2024-06-13

## 2024-06-13 RX ORDER — OXYTOCIN/RINGER'S LACTATE 30/500 ML
1-30 PLASTIC BAG, INJECTION (ML) INTRAVENOUS
Status: DISCONTINUED | OUTPATIENT
Start: 2024-06-13 | End: 2024-06-13

## 2024-06-13 RX ORDER — DIPHENHYDRAMINE HCL 25 MG
25 TABLET ORAL EVERY 6 HOURS PRN
Status: DISCONTINUED | OUTPATIENT
Start: 2024-06-13 | End: 2024-06-14 | Stop reason: HOSPADM

## 2024-06-13 RX ORDER — SODIUM CHLORIDE 9 MG/ML
125 INJECTION, SOLUTION INTRAVENOUS CONTINUOUS
Status: CANCELLED | OUTPATIENT
Start: 2024-06-13

## 2024-06-13 RX ORDER — ACETAMINOPHEN 325 MG/1
650 TABLET ORAL EVERY 4 HOURS PRN
Status: DISCONTINUED | OUTPATIENT
Start: 2024-06-13 | End: 2024-06-14 | Stop reason: HOSPADM

## 2024-06-13 RX ORDER — ROPIVACAINE HYDROCHLORIDE 2 MG/ML
INJECTION, SOLUTION EPIDURAL; INFILTRATION; PERINEURAL CONTINUOUS PRN
Status: DISCONTINUED | OUTPATIENT
Start: 2024-06-13 | End: 2024-06-13 | Stop reason: HOSPADM

## 2024-06-13 RX ORDER — DOCUSATE SODIUM 100 MG/1
100 CAPSULE, LIQUID FILLED ORAL 2 TIMES DAILY
Status: DISCONTINUED | OUTPATIENT
Start: 2024-06-13 | End: 2024-06-14 | Stop reason: HOSPADM

## 2024-06-13 RX ORDER — ROPIVACAINE HYDROCHLORIDE 5 MG/ML
INJECTION, SOLUTION EPIDURAL; INFILTRATION; PERINEURAL AS NEEDED
Status: DISCONTINUED | OUTPATIENT
Start: 2024-06-13 | End: 2024-06-13 | Stop reason: HOSPADM

## 2024-06-13 RX ORDER — ONDANSETRON 2 MG/ML
4 INJECTION INTRAMUSCULAR; INTRAVENOUS EVERY 6 HOURS PRN
Status: CANCELLED | OUTPATIENT
Start: 2024-06-13

## 2024-06-13 RX ORDER — OXYTOCIN/RINGER'S LACTATE 30/500 ML
250 PLASTIC BAG, INJECTION (ML) INTRAVENOUS ONCE
Status: COMPLETED | OUTPATIENT
Start: 2024-06-13 | End: 2024-06-13

## 2024-06-13 RX ADMIN — SODIUM CHLORIDE 2.5 MILLION UNITS: 9 INJECTION, SOLUTION INTRAVENOUS at 12:57

## 2024-06-13 RX ADMIN — CALCIUM CARBONATE (ANTACID) CHEW TAB 500 MG 1000 MG: 500 CHEW TAB at 14:44

## 2024-06-13 RX ADMIN — ROPIVACAINE HYDROCHLORIDE 10 ML/HR: 2 INJECTION, SOLUTION EPIDURAL; INFILTRATION at 13:45

## 2024-06-13 RX ADMIN — SODIUM CHLORIDE 125 ML/HR: 0.9 INJECTION, SOLUTION INTRAVENOUS at 14:44

## 2024-06-13 RX ADMIN — ROPIVACAINE HYDROCHLORIDE 3 ML: 5 INJECTION EPIDURAL; INFILTRATION; PERINEURAL at 13:42

## 2024-06-13 RX ADMIN — IBUPROFEN 600 MG: 600 TABLET, FILM COATED ORAL at 18:10

## 2024-06-13 RX ADMIN — SODIUM CHLORIDE 125 ML/HR: 0.9 INJECTION, SOLUTION INTRAVENOUS at 08:29

## 2024-06-13 RX ADMIN — BENZOCAINE AND LEVOMENTHOL 1 APPLICATION: 200; 5 SPRAY TOPICAL at 20:23

## 2024-06-13 RX ADMIN — Medication 250 MILLI-UNITS/MIN: at 17:15

## 2024-06-13 RX ADMIN — Medication 2 MILLI-UNITS/MIN: at 10:11

## 2024-06-13 RX ADMIN — SODIUM CHLORIDE 5 MILLION UNITS: 0.9 INJECTION, SOLUTION INTRAVENOUS at 08:29

## 2024-06-13 RX ADMIN — DOCUSATE SODIUM 100 MG: 100 CAPSULE, LIQUID FILLED ORAL at 18:10

## 2024-06-13 RX ADMIN — LIDOCAINE HYDROCHLORIDE AND EPINEPHRINE 3 ML: 15; 5 INJECTION, SOLUTION EPIDURAL at 13:38

## 2024-06-13 NOTE — DISCHARGE SUMMARY
"Discharge Summary - Ramez Scott 28 y.o. female MRN: [id number]    Unit/Bed#: -01 Encounter: [id number]    Admission Date: 2024     Discharge Date: *    Patient Active Problem List   Diagnosis    Obesity in pregnancy, antepartum    History of shoulder dystocia in prior pregnancy, currently pregnant in third trimester    Abnormal glucose affecting pregnancy     (spontaneous vaginal delivery)    Fetal macrosomia affecting management of mother, antepartum    Thrombocytopenia affecting pregnancy (HCC)    Diet controlled gestational diabetes mellitus (GDM) in third trimester    Encounter for induction of labor       OBGYN Practice: OB/GYN [facility] Course:   Ramez Scott is a 28 y.o.  who was admitted at 39w0d for induction of labor.  Pregnancy was complicated by diet-controlled gestational diabetes and suspected fetal macrosomia as well as gestational thrombocytopenia.  Platelets were 92 on admission.  Initial cervical exam patient was 4/80/-2.  She was started on a Pitocin titration.  She had artificial rupture of membranes for clear fluid and received an epidural for analgesia.  She progressed to complete cervical dilation and began pushing. ***      Delivery Findings:  Ramez delivered a viable {Desc; male/female:78997}  on 2024 5:13 PM via   . The delivery was uncomplicated***    Baby's Weight:  ;      Apgar scores:   and   at 1 and 5 minutes, respectively  Anesthesia:  ,   QBL:           was transferred to *** nursery. Patient tolerated the procedure well and was transferred to recovery in stable condition.     Her post-partum course was uncomplicated***.  Her post-partum pain was well controlled with oral analgesics. On day of discharge she was ambulating, voiding spontaneously, tolerating oral intake and hemodynamically stable. Mom's blood type is {beoaborh:26945} and  Rhogam {WAS/WAS NOT:[id number]::\"was not\"} given.    She was " discharged home on postpartum day #*** without complications. Patient was instructed to follow up with her OB as an outpatient and was given appropriate warnings to call doctor or provider if she develops signs of infection or uncontrolled pain.    Discharge Problem List by Issue:   Diet controlled gestational diabetes mellitus (GDM) in third trimester  Assessment & Plan    Lab Results   Component Value Date    HGBA1C 5.4 12/01/2023    HGBA1C 5.4 12/01/2023     A1GDM protocol while inpatient    Thrombocytopenia affecting pregnancy (HCC)  Assessment & Plan  Follow up platelets on admission CBC    Fetal macrosomia affecting management of mother, antepartum  Assessment & Plan  AC             37.16 cm        41 weeks 1 day * (>99%)  BPD             9.24 cm        37 weeks 4 days* (67%)  HC             33.01 cm        37 weeks 4 days* (25%)  Femur           7.36 cm        37 weeks 5 days* (50%)     HC/AC           0.89 [0.92 - 1.05]                 (<5%)  FL/AC             20 [20 - 24]  FL/BPD            80 [71 - 87]  EFW [place] 4   3809 grams - 8 lbs 6 oz                 (94%)    History of shoulder dystocia in prior pregnancy, currently pregnant in third trimester  Assessment & Plan  Discussed shoulder dystocia risk especially in setting of suspected fetal macrosomia  Pt was offered primary C/S in office  She understands risks and would like to proceed with induction of labor    * Encounter for induction of labor  Assessment & Plan  Admit to OBGYN   Clear liquid diet   F/u T&S, CBC, RPR   IVF NS 125cc/hr   Continuous fetal monitoring and tocometry   Analgesia at maternal request   Vertex by TAUS  GBS positive, PCN per protocol  Induction plan pitocin            Disposition: Home    Planned Readmission: No    Discharge Medications:   Please see AVS    Discharge instructions :   -Do not place anything (no partner, tampons or douche) in your vagina for 6 weeks.  -You may walk for exercise for the first 6 weeks then  gradually return to your usual activities.   -Please do not drive for 1 week if you have no stitches and for 2 weeks if you have stitches or underwent a  delivery.    -You may take baths or shower per your preference.   -Please look at your bust (breasts) in the mirror daily and call your doctor for redness or tenderness or increased warmth.   - If you have had a  section please look at your incision daily as well and call provider for increasing redness or steady drainage from the incision.   -Please call your doctor's office if temperature > 100.4*F or 38* C, worsening pain or a foul discharge.    Follow Up:  - Follow up in *** weeks for postpartum visit    ***

## 2024-06-13 NOTE — L&D DELIVERY NOTE
DELIVERY NOTE  Ramez Scott 28 y.o. female MRN: [id number]  Unit/Bed#: -01 Encounter: [id number]    Obstetrician:    Dr. Rachel Dejesus MD    Assistant:   Dr. Joaquin Ding    Pre-Delivery Diagnosis:   Patient Active Problem List   Diagnosis    Obesity in pregnancy, antepartum    History of shoulder dystocia in prior pregnancy, currently pregnant in third trimester    Abnormal glucose affecting pregnancy     (spontaneous vaginal delivery)    Fetal macrosomia affecting management of mother, antepartum    Thrombocytopenia affecting pregnancy (HCC)    Diet controlled gestational diabetes mellitus (GDM) in third trimester    Encounter for induction of labor         Post-Delivery Diagnosis:   Same as above - Delivered      Procedure:  Spontaneous vaginal delivery      Specimens:   Cord blood obtained   Placenta; normal appearing, central insertion, intact   Arterial and venous blood gases (below)     Gases:  Umbilical Cord Venous Blood Gas:  Results from last 7 days   Lab Units 24  1714   PH COV  7.335   PCO2 COV mm HG 37.3   HCO3 COV mmol/L 19.4   BASE EXC COV mmol/L -5.6*   O2 CT CD VB mL/dL 16.2   O2 HGB, VENOUS CORD % 64.7     Umbilical Cord Arterial Blood Gas:  Results from last 7 days   Lab Units 24  1714   PH COA  7.222*   PCO2 COA  54.3   PO2 COA mm HG 19.7   HCO3 COA mmol/L 21.8   BASE EXC COA mmol/L -6.7*   O2 CONTENT CORD ART ml/dl 9.9   O2 HGB, ARTERIAL CORD % 38.5       Quantitative Blood Loss:   207 mL           Complications:    None    Brief Description of Labor Course:  Patient is a 28 y.o.  at 39w0d presenting for eIOL. Patient is a GBS carrier and was treated adequately. She also had GDMA1 with well controlled sugars.     Description of Delivery:   With the assistance of maternal expulsive efforts and gentle downward traction of the fetal head, the anterior (right) shoulder was delivered without difficulty, followed by the remainder of the infant's body and contralateral  arm. Patient then delivered a viable male  an over intact perineum with a no laceration noted. A nuchal cord was not noted.    After delivery of the , delayed clamping of the umbilical cord was undertaken for 30 seconds. The  was noted to have good tone and cry spontaneously. There was no apparent injury to the . The cord was then doubly clamped and cut and the  was passed off to  staff for routine care. Umbilical cord blood and umbilical artery and venous gases were collected. Placenta was delivered with fundal massage and gentle traction on the cord with active management of the third stage of labor. Placenta delivered intact with a 3-vessel cord. Active management of the third stage of labor was undertaken with IV pitocin at 250milliunits/min. A bimanual exam was performed . Bleeding was noted to be under control.     Outcome:  Living  with APGARS 8, 9 at 1 and 5 minutes, respectively.     weight pending    Perineal Inspection/Laceration Repair  Inspection of the perineum, vagina, labia, cervix, and urethra revealed no lacerations.     Conclusion:  Mother and baby are currently recovering nicely in stable condition.    Rachel Dejesus MD   2024 5:26 PM

## 2024-06-13 NOTE — OB LABOR/OXYTOCIN SAFETY PROGRESS
Oxytocin Safety Progress Check Note - Ramez Scott 28 y.o. female MRN: [id number]    Unit/Bed#: -01 Encounter: [id number]    Dose (omid-units/min) Oxytocin: 8 omid-units/min  Contraction Frequency (minutes): 2-4  Contraction Intensity: Mild  Uterine Activity Characteristics: Irregular  Cervical Dilation: 5        Cervical Effacement: 80  Fetal Station: -2  Baseline Rate (FHR): 130 bpm  Fetal Heart Rate (FHT): 130 BPM  FHR Category: I               Vital Signs:   Vitals:    06/13/24 1405   BP: 112/58   Pulse: 69   SpO2:        Notes/comments:   Ramez is resting comfortably after receiving epidural. SVE as above. Amniotomy performed for clear fluid. FHR category I. Continue pitocin titration.    Estefania Manzo MD 6/13/2024 2:13 PM

## 2024-06-13 NOTE — ANESTHESIA PREPROCEDURE EVALUATION
Procedure:  LABOR ANALGESIA    Relevant Problems   GYN   (+) 39 weeks gestation of pregnancy      HEMATOLOGY   (+) Thrombocytopenia affecting pregnancy (HCC)      Obstetrics/Gynecology   (+) Abnormal glucose affecting pregnancy   (+) Diet controlled gestational diabetes mellitus (GDM) in third trimester   (+) History of shoulder dystocia in prior pregnancy, currently pregnant in third trimester   (+) Obesity in pregnancy, antepartum      Other   (+) Encounter for induction of labor   (+) Fetal macrosomia affecting management of mother, antepartum        Physical Exam    Airway    Mallampati score: III  TM Distance: <3 FB  Neck ROM: limited     Dental   No notable dental hx     Cardiovascular  Cardiovascular exam normal    Pulmonary  Pulmonary exam normal     Other Findings  post-pubertal.      Anesthesia Plan  ASA Score- 3     Anesthesia Type- epidural with ASA Monitors.         Additional Monitors:     Airway Plan:            Plan Factors-Exercise tolerance (METS): >4 METS.    Chart reviewed.   Existing labs reviewed.     Patient is not a current smoker.      Obstructive sleep apnea risk education given perioperatively.        Induction-     Postoperative Plan-     Perioperative Resuscitation Plan - Level 1 - Full Code.       Informed Consent- Anesthetic plan and risks discussed with patient.

## 2024-06-13 NOTE — ASSESSMENT & PLAN NOTE
Lab Results   Component Value Date    HGBA1C 5.4 12/01/2023    HGBA1C 5.4 12/01/2023     A1GDM protocol while inpatient

## 2024-06-13 NOTE — ASSESSMENT & PLAN NOTE
AC             37.16 cm        41 weeks 1 day * (>99%)  BPD             9.24 cm        37 weeks 4 days* (67%)  HC             33.01 cm        37 weeks 4 days* (25%)  Femur           7.36 cm        37 weeks 5 days* (50%)     HC/AC           0.89 [0.92 - 1.05]                 (<5%)  FL/AC             20 [20 - 24]  FL/BPD            80 [71 - 87]  EFW Hadlock 4   3809 grams - 8 lbs 6 oz                 (94%)

## 2024-06-13 NOTE — ASSESSMENT & PLAN NOTE
Discussed shoulder dystocia risk especially in setting of suspected fetal macrosomia  Pt was offered primary C/S in office  She understands risks and would like to proceed with induction of labor

## 2024-06-13 NOTE — ANESTHESIA POSTPROCEDURE EVALUATION
"Post-Op Assessment Note    CV Status:  Stable    Pain management: adequate      Post-op block assessment: catheter intact and no complications   Mental Status:  Awake   Hydration Status:  Stable   PONV Controlled:  Controlled   Airway Patency:  Patent     Post Op Vitals Reviewed: Yes    No anethesia notable event occurred.    Staff: Anesthesiologist         /55   Pulse 82   Temp 98.4 °F (36.9 °C)   Resp 18   Ht 5' 2\" (1.575 m)   Wt 133 kg (293 lb)   LMP 09/14/2023 (Exact Date)   SpO2 99%   BMI 53.59 kg/m²         BP      Temp      Pulse     Resp      SpO2        "

## 2024-06-13 NOTE — ANESTHESIA PROCEDURE NOTES
Epidural Block    Patient location during procedure: OB/L&D  Start time: 6/13/2024 1:37 PM  Reason for block: primary anesthetic  Staffing  Performed by: Jillian Saez MD  Authorized by: Jillian Saez MD    Preanesthetic Checklist  Completed: patient identified, IV checked, site marked, risks and benefits discussed, surgical consent, monitors and equipment checked, pre-op evaluation and timeout performed  Epidural  Patient position: sitting  Prep: Betadine  Sedation Level: no sedation  Patient monitoring: continuous pulse oximetry, frequent blood pressure checks and heart rate  Approach: midline  Location: lumbar, L3-4  Injection technique: DEEPTHI saline  Needle  Needle type: Tuohy   Needle gauge: 18 G  Needle insertion depth: 8.5 cm  Catheter type: multi-orifice  Catheter size: 20 G  Catheter at skin depth: 14 cm  Catheter securement method: clear occlusive dressing  Test dose: negativelidocaine-epinephrine (XYLOCAINE-MPF/EPINEPHRINE) 1.5 %-1:200,000 injection 3 mL - Epidural   3 mL - 6/13/2024 1:38:00 PM  Assessment  Sensory level: T10  Number of attempts: 1negative aspiration for CSF, negative aspiration for heme and no paresthesia on injection  patient tolerated the procedure well with no immediate complications

## 2024-06-13 NOTE — OB LABOR/OXYTOCIN SAFETY PROGRESS
Oxytocin Safety Progress Check Note - Ramez Scott 28 y.o. female MRN: [id number]    Unit/Bed#: -01 Encounter: [id number]    Dose (omid-units/min) Oxytocin: 2 omid-units/min  Contraction Frequency (minutes): 2-5  Contraction Intensity: Mild  Uterine Activity Characteristics: Irregular  Cervical Dilation: 4        Cervical Effacement: 80  Fetal Station: -2  Baseline Rate (FHR): 130 bpm  Fetal Heart Rate (FHT): 125 BPM  FHR Category: I               Vital Signs:   There were no vitals filed for this visit.    Notes/comments:   Ramez is resting, beginning to feel more pain with contractions. SVE deferred at this check. We discussed getting epidural prior to AROM, anticipate at next check. FHR category I. Continue pitocin titration.    Estefania Manzo MD 6/13/2024 11:53 AM

## 2024-06-13 NOTE — DISCHARGE INSTRUCTIONS
Vaginal Delivery   WHAT YOU SHOULD KNOW:   A vaginal delivery is the birth of your baby through your vagina (birth canal).        AFTER YOU LEAVE:   Medicines:  NSAIDs  help decrease swelling and pain or fever. This medicine is available with or without a doctor's order. NSAIDs can cause stomach bleeding or kidney problems in certain people. If you take blood thinner medicine, always ask your healthcare provider if NSAIDs are safe for you. Always read the medicine label and follow directions.    Take your medicine as directed.  Call your healthcare provider if you think your medicine is not helping or if you have side effects. Tell him if you are allergic to any medicine. Keep a list of the medicines, vitamins, and herbs you take. Include the amounts, and when and why you take them. Bring the list or the pill bottles to follow-up visits. Carry your medicine list with you in case of an emergency.  Follow up with your primary healthcare provider:  Most women need to return 6 weeks after a vaginal delivery. Ask about how to care for your wounds or stitches. Write down your questions so you remember to ask them during your visits.  Activity:  Rest as much as possible. Try to keep all activities short. You may be able to do some exercise soon after you have your baby. Talk with your primary healthcare provider before you start exercising. If you work outside the home, ask when you can return to your job.  Kegel exercises:  Kegel exercises may help your vaginal and rectal muscles heal faster. You can do Kegel exercises by tightening and relaxing the muscles around your vagina. Kegel exercises help make the muscles stronger.   Breast care:  When your milk comes in, your breasts may feel full and hard. Ask how to care for your breasts, even if you are not breastfeeding.   Constipation:  Do not try to push the bowel movement out if it is too hard. High-fiber foods, extra liquids, and regular exercise can help you prevent  constipation. Examples of high-fiber foods are fruit and bran. Prune juice and water are good liquids to drink. Regular exercise helps your digestive system work. You may also be told to take over-the-counter fiber and stool softener medicines. Take these items as directed.   Hemorrhoids:  Pregnancy can cause severe hemorrhoids. You may have rectal pain because of the hemorrhoids. Ask how to prevent or treat hemorrhoids.  Perineum care:  Your perineum is the area between your vagina and anus. Keep the area clean and dry to help it heal and to prevent infection. Wash the area gently with soap and water when you bathe or shower. Rinse your perineum with warm water when you use the toilet. Your primary healthcare provider may suggest you use a warm sitz bath to help decrease pain. A sitz bath is a bathtub or basin filled to hip level. Stay in the sitz bath for 20 to 30 minutes, or as directed.   Vaginal discharge:  You will have vaginal discharge, called lochia, after your delivery. The lochia is bright red the first day or two after the birth. By the fourth day, the amount decreases, and it turns red-brown. Use a sanitary pad rather than a tampon to prevent a vaginal infection. It is normal to have lochia up to 8 weeks after your baby is born.   Monthly periods:  Your period may start again within 7 to 12 weeks after your baby is born. If you are breastfeeding, it may take longer for your period to start again. You can still get pregnant again even though you do not have your monthly period. Talk with your primary healthcare provider about a birth control method that will be good for you if you do not want to get pregnant.  Mood changes:  Many new mothers have some kind of mood changes after delivery. Some of these changes occur because of lack of sleep, hormone changes, and caring for a new baby. Some mood changes can be more serious, such as postpartum depression. Talk with your primary healthcare provider if you  feel unable to care for yourself or your baby.  Sexual activity:  You may need to avoid sex for 6 to 7 weeks after you have your baby. You may notice you have a decreased desire for sex, or sex may be painful. You may need to use a vaginal lubricant (gel) to help make sex more comfortable.  Contact your primary healthcare provider if:   You have heavy vaginal bleeding that fills 1 or more sanitary pads in 1 hour.    You have a fever.    Your pain does not go away, or gets worse.    The skin between your vagina and rectum is swollen, warm, or red.    You have swollen, hard, or painful breasts.    You feel very sad or depressed.    You feel more tired than usual.     You have questions or concerns about your condition or care.  Seek care immediately or call 911 if:   You have pus or yellow drainage coming from your vagina or wound.    You are urinating very little, or not at all.    Your arm or leg feels warm, tender, and painful. It may look swollen and red.    You feel lightheaded, have sudden and worsening chest pain, or trouble breathing. You may have more pain when you take deep breaths or cough, or you may cough up blood.  © 2014 "[employer] Inc. Information is for End User's use only and may not be sold, redistributed or otherwise used for commercial purposes. All illustrations and images included in CareNotes® are the copyrighted property of [employer] or "[employer].  The above information is an  only. It is not intended as medical advice for individual conditions or treatments. Talk to your doctor, nurse or pharmacist before following any medical regimen to see if it is safe and effective for you.

## 2024-06-13 NOTE — OB LABOR/OXYTOCIN SAFETY PROGRESS
Oxytocin Safety Progress Check Note - Ramez Scott 28 y.o. female MRN: [id number]    Unit/Bed#: -01 Encounter: [id number]    Dose (omid-units/min) Oxytocin: 12 omid-units/min  Contraction Frequency (minutes): 1-3  Contraction Intensity: Moderate  Uterine Activity Characteristics: Regular  Cervical Dilation: 10  Dilation Complete Date: 06/13/24  Dilation Complete Time: 1625  Cervical Effacement: 100  Fetal Station: -2  Baseline Rate (FHR): 130 bpm  Fetal Heart Rate (FHT): 130 BPM  FHR Category: 1               Vital Signs:   Vitals:    06/13/24 1601   BP: 119/60   Pulse: 68   Resp:    Temp:    SpO2:        Notes/comments:   Patient feeling more pressure. Now complete. Will begin pushing shortly     Joaquin Ding MD 6/13/2024 4:29 PM

## 2024-06-13 NOTE — ASSESSMENT & PLAN NOTE
Admit to OBGYN   Clear liquid diet   F/u T&S, CBC, RPR   IVF NS 125cc/hr   Continuous fetal monitoring and tocometry   Analgesia at maternal request   Vertex by TAUS  GBS positive, PCN per protocol  Induction plan pitocin

## 2024-06-13 NOTE — H&P
H & P- Obstetrics   Ramez Scott 28 y.o. female MRN: [id number]  Unit/Bed#: -01 Encounter: [id number]      Assessment/Plan:    Ramez neff a 28 y.o.  at 39w0d admitted for an induction of labor    SVE: Cervical Dilation: 4  Cervical Effacement: 80  Cervical Consistency: Soft  Fetal Station: -2  Position: RMP    Diet controlled gestational diabetes mellitus (GDM) in third trimester  Assessment & Plan    Lab Results   Component Value Date    HGBA1C 5.4 2023    HGBA1C 5.4 2023     A1GDM protocol while inpatient    Thrombocytopenia affecting pregnancy (HCC)  Assessment & Plan  Follow up platelets on admission CBC    Fetal macrosomia affecting management of mother, antepartum  Assessment & Plan  AC             37.16 cm        41 weeks 1 day * (>99%)  BPD             9.24 cm        37 weeks 4 days* (67%)  HC             33.01 cm        37 weeks 4 days* (25%)  Femur           7.36 cm        37 weeks 5 days* (50%)     HC/AC           0.89 [0.92 - 1.05]                 (<5%)  FL/AC             20 [20 - 24]  FL/BPD            80 [71 - 87]  EFW Hadlock 4   3809 grams - 8 lbs 6 oz                 (94%)    History of shoulder dystocia in prior pregnancy, currently pregnant in third trimester  Assessment & Plan  Discussed shoulder dystocia risk especially in setting of suspected fetal macrosomia  Pt was offered primary C/S in office  She understands risks and would like to proceed with induction of labor    * Encounter for induction of labor  Assessment & Plan  Admit to OBGYN   Clear liquid diet   F/u T&S, CBC, RPR   IVF NS 125cc/hr   Continuous fetal monitoring and tocometry   Analgesia at maternal request   Vertex by TAUS  GBS positive, PCN per protocol  Induction plan pitocin          Patient of: [facility]  This patient will be an INPATIENT  and I certify the anticipated length of stay is >2 Midnights  Discussed with Dr. Dejesus      SUBJECTIVE:    Chief Complaint: I am here for my  induction    HPI: Ramez Scott is a 28 y.o.  with an ZAKI of 2024, Date entered prior to episode creation at 39w0d who is being admitted for induction of labor. She denies having uterine contractions, has no LOF, and reports no VB. She states she has felt good FM. This pregnancy is complicated by A1GDM, suspected fetal macrosomia, history of shoulder dystocia. She was counseled on shoulder dystocia in the office and offered a primary C/S, which she declined. All other review of systems is negative.       Pregnancy Plan:  Pregnancy: Mason  Fetal sex: Male  Support person: Ap Larry     Delivery Plans  Planned delivery method: Vaginal  Planned delivery location: [facility] L&D  Planned anesthesia: Epidural  Acceptable blood products: All     Post-Delivery Plans  Feeding intentions: Breast Milk  Circumcision requested: No      Patient Active Problem List   Diagnosis    Obesity in pregnancy, antepartum    History of shoulder dystocia in prior pregnancy, currently pregnant in third trimester    Abnormal glucose affecting pregnancy    39 weeks gestation of pregnancy    Fetal macrosomia affecting management of mother, antepartum    Thrombocytopenia affecting pregnancy (HCC)    Diet controlled gestational diabetes mellitus (GDM) in third trimester    Encounter for induction of labor       OB History    Para Term  AB Living   7 4 4   2 4   SAB IAB Ectopic Multiple Live Births   2       4      # Outcome Date GA Lbr Patrick/2nd Weight Sex Type Anes PTL Lv   7 Current            6 Term 21   3685 g (8 lb 2 oz) M Vag-Spont   SKINNY   5 SAB 2020           4 Term 19   3856 g (8 lb 8 oz) M Vag-Spont   SKINNY   3 SAB 19           2 Term 16   3317 g (7 lb 5 oz) M Vag-Spont   SKINNY   1 Term 01/02/15   2410 g (5 lb 5 oz) F Vag-Spont   SKINNY       Past Medical History:   Diagnosis Date    Chlamydia     in the past    Varicella     had vaccine       Past Surgical History:   Procedure Laterality Date     FRACTURE SURGERY  2015    right hand       Social History     Tobacco Use    Smoking status: Former     Current packs/day: 0.25     Average packs/day: 0.3 packs/day for 5.0 years (1.3 ttl pk-yrs)     Types: Cigarettes    Smokeless tobacco: Never   Substance Use Topics    Alcohol use: Not Currently     Comment: Social drink       No Known Allergies      Medications Prior to Admission:     Blood Glucose Monitoring Suppl (OneTouch Verio Flex System) w/Device KIT    OneTouch Delica Lancets 33G MISC        OBJECTIVE:  Vitals:  /87  HR 87    Physical Exam:  General: Well appearing, no distress  Respiratory: Unlabored breathing, lungs clear to auscultation  Cardiovascular: Regular rate and rhythm  Abdomen: Soft, gravid, nontender  Fundal Height: Appropriate for gestational age.  Extremities: Warm and well perfused.  Non tender.  Psychiatric: Behavioral normal        FHT:  Baseline Rate (FHR): 135 bpm  FHR Category: Category I    TOCO:   Contraction Frequency (minutes): 4  Contraction Duration (seconds): 90  Contraction Intensity: Mild      Prenatal Labs: I have personally reviewed pertinent reports.  Blood Type:   Lab Results   Component Value Date/Time    ABO Grouping AB 04/25/2024 12:11 PM   D (Rh type):   Lab Results   Component Value Date/Time    Rh Factor Positive 04/25/2024 12:11 PM   Antibody Screen: Negative  HCT/HGB:   Lab Results   Component Value Date/Time    Hematocrit 33.4 (L) 06/03/2024 10:27 AM    Hemoglobin 10.7 (L) 06/03/2024 10:27 AM   MCV:   Lab Results   Component Value Date/Time    MCV 86 06/03/2024 10:27 AM   Platelets:   Lab Results   Component Value Date/Time    Platelets 121 (L) 06/03/2024 10:27 AM    1 hour Glucola:   Lab Results   Component Value Date/Time    Glucose 138 (H) 04/25/2024 12:11 PM    3 hour GTT:   Lab Results   Component Value Date/Time    Glucose, GTT - 3 Hour 100 05/02/2024 12:34 PM   Varicella:   Lab Results   Component Value Date/Time    Varicella IgG IMMUNE 12/01/2023  "12:00 AM   Rubella: immune  VDRL/RPR:   Lab Results   Component Value Date/Time    RPR Non-Reactive 12/01/2023 12:00 AM   Urine Culture/Screen:   Lab Results   Component Value Date/Time    Urine Culture >100,000 cfu/ml 03/27/2024 11:20 AM   Hep B:   Lab Results   Component Value Date/Time    Hepatitis B Surface Ag Negative 12/01/2023 12:00 AM   Hep C: Negative  HIV:   Lab Results   Component Value Date/Time    HIV-1/HIV-2 AB Non-Reactive 12/01/2023 12:00 AM   Chlamydia: Negative  Gonorrhea:   Lab Results   Component Value Date/Time    N gonorrhoeae, DNA Probe Negative 04/25/2024 12:11 PM   Group B Strep:    Lab Results   Component Value Date/Time    Strep Grp B PCR Positive (A) 05/20/2024 01:42 PM            Estefania Manzo MD  6/13/2024  8:13 AM        Portions of the record may have been created with voice recognition software.  Occasional wrong word or \"sound a like\" substitutions may have occurred due to the inherent limitations of voice recognition software.  Read the chart carefully and recognize, using context, where substitutions have occurred    "

## 2024-06-13 NOTE — PLAN OF CARE
Problem: PAIN - ADULT  Goal: Verbalizes/displays adequate comfort level or baseline comfort level  Description: Interventions:  - Encourage patient to monitor pain and request assistance  - Assess pain using appropriate pain scale  - Administer analgesics based on type and severity of pain and evaluate response  - Implement non-pharmacological measures as appropriate and evaluate response  - Consider cultural and social influences on pain and pain management  - Notify physician/advanced practitioner if interventions unsuccessful or patient reports new pain  Outcome: Progressing     Problem: INFECTION - ADULT  Goal: Absence or prevention of progression during hospitalization  Description: INTERVENTIONS:  - Assess and monitor for signs and symptoms of infection  - Monitor lab/diagnostic results  - Monitor all insertion sites, i.e. indwelling lines, tubes, and drains  - Monitor endotracheal if appropriate and nasal secretions for changes in amount and color  - [place] appropriate cooling/warming therapies per order  - Administer medications as ordered  - Instruct and encourage patient and family to use good hand hygiene technique  - Identify and instruct in appropriate isolation precautions for identified infection/condition  Outcome: Progressing  Goal: Absence of fever/infection during neutropenic period  Description: INTERVENTIONS:  - Monitor WBC    Outcome: Progressing     Problem: SAFETY ADULT  Goal: Patient will remain free of falls  Description: INTERVENTIONS:  - Educate patient/family on patient safety including physical limitations  - Instruct patient to call for assistance with activity   - Consult OT/PT to assist with strengthening/mobility   - Keep Call bell within reach  - Keep bed low and locked with side rails adjusted as appropriate  - Keep care items and personal belongings within reach  - Initiate and maintain comfort rounds  - Make Fall Risk Sign visible to staff  - Offer Toileting every  Hours,  in advance of need  - Initiate/Maintain alarm  - Obtain necessary fall risk management equipment:   - Apply yellow socks and bracelet for high fall risk patients  - Consider moving patient to room near nurses station  Outcome: Progressing  Goal: Maintain or return to baseline ADL function  Description: INTERVENTIONS:  -  Assess patient's ability to carry out ADLs; assess patient's baseline for ADL function and identify physical deficits which impact ability to perform ADLs (bathing, care of mouth/teeth, toileting, grooming, dressing, etc.)  - Assess/evaluate cause of self-care deficits   - Assess range of motion  - Assess patient's mobility; develop plan if impaired  - Assess patient's need for assistive devices and provide as appropriate  - Encourage maximum independence but intervene and supervise when necessary  - Involve family in performance of ADLs  - Assess for home care needs following discharge   - Consider OT consult to assist with ADL evaluation and planning for discharge  - Provide patient education as appropriate  Outcome: Progressing  Goal: Maintains/Returns to pre admission functional level  Description: INTERVENTIONS:  - Perform AM-PAC 6 Click Basic Mobility/ Daily Activity assessment daily.  - Set and communicate daily mobility goal to care team and patient/family/caregiver.   - Collaborate with rehabilitation services on mobility goals if consulted  - Perform Range of Motion  times a day.  - Reposition patient every  hours.  - Dangle patient  times a day  - Stand patient  times a day  - Ambulate patient  times a day  - Out of bed to chair  times a day   - Out of bed for meals  times a day  - Out of bed for toileting  - Record patient progress and toleration of activity level   Outcome: Progressing     Problem: DISCHARGE PLANNING  Goal: Discharge to home or other facility with appropriate resources  Description: INTERVENTIONS:  - Identify barriers to discharge w/patient and caregiver  - Arrange for  needed discharge resources and transportation as appropriate  - Identify discharge learning needs (meds, wound care, etc.)  - Arrange for interpretive services to assist at discharge as needed  - Refer to Case Management Department for coordinating discharge planning if the patient needs post-hospital services based on physician/advanced practitioner order or complex needs related to functional status, cognitive ability, or social support system  Outcome: Progressing     Problem: Knowledge Deficit  Goal: Patient/family/caregiver demonstrates understanding of disease process, treatment plan, medications, and discharge instructions  Description: Complete learning assessment and assess knowledge base.  Interventions:  - Provide teaching at level of understanding  - Provide teaching via preferred learning methods  Outcome: Progressing

## 2024-06-13 NOTE — PROGRESS NOTES
Today's Date: 6/13/2024  Pt's Preferred Lab: None Specified  Ambulatory Order    Lab Ordered: 2hr (75GM) GTT   Reason: to screen for T2DM s/p delivery r/t H/O GDM  Time-Frame to be collected: 4-12 weeks postpartum    Patient Instructions: Fasting = Do NOT eat or drink anything except WATER for at least 8 hours before the test.    *For [facility], please call [phone] to schedule.   *To request lab be sent to alternative lab including [facility] or [facility], Call: [phone] or Message Diabetes Team via Preclick Message to JERALD Lund RD   Diabetes Educator   [facility] - Maternal Fetal Medicine  Diabetes and Pregnancy Program

## 2024-06-14 VITALS
BODY MASS INDEX: 53.92 KG/M2 | SYSTOLIC BLOOD PRESSURE: 121 MMHG | OXYGEN SATURATION: 98 % | WEIGHT: 293 LBS | HEIGHT: 62 IN | RESPIRATION RATE: 18 BRPM | HEART RATE: 59 BPM | TEMPERATURE: 98 F | DIASTOLIC BLOOD PRESSURE: 62 MMHG

## 2024-06-14 LAB
DME PARACHUTE DELIVERY DATE ACTUAL: NORMAL
DME PARACHUTE DELIVERY DATE REQUESTED: NORMAL
DME PARACHUTE ITEM DESCRIPTION: NORMAL
DME PARACHUTE ORDER STATUS: NORMAL
DME PARACHUTE SUPPLIER NAME: NORMAL
DME PARACHUTE SUPPLIER PHONE: NORMAL

## 2024-06-14 PROCEDURE — NC001 PR NO CHARGE: Performed by: OBSTETRICS & GYNECOLOGY

## 2024-06-14 PROCEDURE — 99024 POSTOP FOLLOW-UP VISIT: CPT | Performed by: OBSTETRICS & GYNECOLOGY

## 2024-06-14 RX ORDER — BENZOCAINE/MENTHOL 6 MG-10 MG
1 LOZENGE MUCOUS MEMBRANE DAILY PRN
Start: 2024-06-14

## 2024-06-14 RX ORDER — ACETAMINOPHEN 325 MG/1
650 TABLET ORAL EVERY 4 HOURS PRN
Start: 2024-06-14

## 2024-06-14 RX ORDER — IBUPROFEN 600 MG/1
600 TABLET ORAL EVERY 6 HOURS
Qty: 30 TABLET | Refills: 0 | Status: SHIPPED | OUTPATIENT
Start: 2024-06-14

## 2024-06-14 RX ADMIN — IBUPROFEN 600 MG: 600 TABLET, FILM COATED ORAL at 00:39

## 2024-06-14 RX ADMIN — IBUPROFEN 600 MG: 600 TABLET, FILM COATED ORAL at 08:02

## 2024-06-14 RX ADMIN — DOCUSATE SODIUM 100 MG: 100 CAPSULE, LIQUID FILLED ORAL at 08:02

## 2024-06-14 RX ADMIN — IBUPROFEN 600 MG: 600 TABLET, FILM COATED ORAL at 15:59

## 2024-06-14 NOTE — PLAN OF CARE
Problem: PAIN - ADULT  Goal: Verbalizes/displays adequate comfort level or baseline comfort level  Description: Interventions:  - Encourage patient to monitor pain and request assistance  - Assess pain using appropriate pain scale  - Administer analgesics based on type and severity of pain and evaluate response  - Implement non-pharmacological measures as appropriate and evaluate response  - Consider cultural and social influences on pain and pain management  - Notify physician/advanced practitioner if interventions unsuccessful or patient reports new pain  Outcome: Progressing     Problem: INFECTION - ADULT  Goal: Absence or prevention of progression during hospitalization  Description: INTERVENTIONS:  - Assess and monitor for signs and symptoms of infection  - Monitor lab/diagnostic results  - Monitor all insertion sites, i.e. indwelling lines, tubes, and drains  - Monitor endotracheal if appropriate and nasal secretions for changes in amount and color  - [place] appropriate cooling/warming therapies per order  - Administer medications as ordered  - Instruct and encourage patient and family to use good hand hygiene technique  - Identify and instruct in appropriate isolation precautions for identified infection/condition  Outcome: Progressing  Goal: Absence of fever/infection during neutropenic period  Description: INTERVENTIONS:  - Monitor WBC    Outcome: Progressing     Problem: SAFETY ADULT  Goal: Patient will remain free of falls  Description: INTERVENTIONS:  - Educate patient/family on patient safety including physical limitations  - Instruct patient to call for assistance with activity   - Consult OT/PT to assist with strengthening/mobility   - Keep Call bell within reach  - Keep bed low and locked with side rails adjusted as appropriate  - Keep care items and personal belongings within reach  - Initiate and maintain comfort rounds  - Make Fall Risk Sign visible to staff  - Apply yellow socks and bracelet  for high fall risk patients  - Consider moving patient to room near nurses station  Outcome: Progressing  Goal: Maintain or return to baseline ADL function  Description: INTERVENTIONS:  -  Assess patient's ability to carry out ADLs; assess patient's baseline for ADL function and identify physical deficits which impact ability to perform ADLs (bathing, care of mouth/teeth, toileting, grooming, dressing, etc.)  - Assess/evaluate cause of self-care deficits   - Assess range of motion  - Assess patient's mobility; develop plan if impaired  - Assess patient's need for assistive devices and provide as appropriate  - Encourage maximum independence but intervene and supervise when necessary  - Involve family in performance of ADLs  - Assess for home care needs following discharge   - Consider OT consult to assist with ADL evaluation and planning for discharge  - Provide patient education as appropriate  Outcome: Progressing  Goal: Maintains/Returns to pre admission functional level  Description: INTERVENTIONS:  - Perform AM-PAC 6 Click Basic Mobility/ Daily Activity assessment daily.  - Set and communicate daily mobility goal to care team and patient/family/caregiver.   - Collaborate with rehabilitation services on mobility goals if consulted  - Out of bed for toileting  - Record patient progress and toleration of activity level   Outcome: Progressing     Problem: DISCHARGE PLANNING  Goal: Discharge to home or other facility with appropriate resources  Description: INTERVENTIONS:  - Identify barriers to discharge w/patient and caregiver  - Arrange for needed discharge resources and transportation as appropriate  - Identify discharge learning needs (meds, wound care, etc.)  - Arrange for interpretive services to assist at discharge as needed  - Refer to Case Management Department for coordinating discharge planning if the patient needs post-hospital services based on physician/advanced practitioner order or complex needs  related to functional status, cognitive ability, or social support system  Outcome: Progressing     Problem: Knowledge Deficit  Goal: Patient/family/caregiver demonstrates understanding of disease process, treatment plan, medications, and discharge instructions  Description: Complete learning assessment and assess knowledge base.  Interventions:  - Provide teaching at level of understanding  - Provide teaching via preferred learning methods  Outcome: Progressing

## 2024-06-14 NOTE — PROGRESS NOTES
Progress Note - OB/GYN   Ramez Scott 28 y.o. female MRN: [id number]  Unit/Bed#: -01 Encounter: [id number]      Assessment/Plan    Ramez Scott is a 28 y.o.  who is PPD 1 s/p  at 39w0d     Thrombocytopenia affecting pregnancy (HCC)  Assessment & Plan  platelets on admission: 92    *  (spontaneous vaginal delivery)  Assessment & Plan  -routine post partum care  -ambulate and advance diet as tolerated         Disposition: Anticipate discharge home postpartum Day #1-2  Barriers to discharge: ongoing couplet care      Subjective/Objective     Subjective: Postpartum state    Pain: no  Tolerating PO: yes  Voiding: yes  Flatus: yes  BM: no  Ambulating: yes  Breastfeeding: Breastfeeding and Bottle feeding  Chest pain: no  Shortness of breath: no  Leg pain: no  Lochia: minimal    Objective:     Vitals:  Vitals:    24 1912 24 1915 24 2300 24 0300   BP:  128/62 134/77 126/86   BP Location:   Left arm Left arm   Pulse: 90  64 66   Resp:   18 18   Temp:   97.7 °F (36.5 °C) 98.2 °F (36.8 °C)   TempSrc:   Temporal Oral   SpO2:    97%   Weight:       Height:           Physical Exam:   GEN: appears well, alert and oriented x 3, pleasant and cooperative   CV: Regular rate and rhythm  RESP: non labored breathing, lungs clear to auscultation   ABDOMEN: soft, no tenderness, no distention, Uterine fundus firm and non-tender, at the umbilicus  EXTREMITIES: non-tender  NEURO Alert and oriented to person, place, and time.       Lab Results   Component Value Date    WBC 8.50 2024    HGB 10.6 (L) 2024    HCT 32.1 (L) 2024    MCV 82 2024    PLT 92 (L) 2024         Estefania Manzo MD  Obstetrics & Gynecology  24

## 2024-06-14 NOTE — CASE MANAGEMENT
Case Management Progress Note    Patient name Ramez Scott  Location /-01 MRN [id number]  : 1995 Date 2024       LOS (days): 1  Geometric Mean LOS (GMLOS) (days):   Days to GMLOS:        OBJECTIVE: Consult Zomee Z2 with cups        Current admission status: Inpatient  Preferred Pharmacy:   [facility] EMILY DEJESUS  [address]  [address] [address]  Phone: [phone] Fax: [phone]    Primary Care Provider: No primary care provider on file.    Primary Insurance: ABDIRIZAK RABAGO  Secondary Insurance:     PROGRESS NOTE:  CM ordered Zomee Z2 with cups breast pump via [employer].  Approved.  No OOP cost.  CM delivered breast pump to [facility].  Delivery ticket signed.  No other discharge needs at this time.

## 2024-06-14 NOTE — LACTATION NOTE
This note was copied from a baby's chart.  CONSULT - LACTATION  Baby Boy (Britt Scott 1 days male MRN: [id number]    [facility] NURSERY Room / Bed: (N)/(N) Encounter: [id number]    Maternal Information     MOTHER:  Ramez Scott  Maternal Age: 28 y.o.  OB History: # 1 - Date: 01/02/15, Sex: Female, Weight: 2410 g (5 lb 5 oz), GA: None, Type: Vaginal, Spontaneous, Apgar1: None, Apgar5: None, Living: Living, Birth Comments: None    # 2 - Date: 05/13/16, Sex: Male, Weight: 3317 g (7 lb 5 oz), GA: None, Type: Vaginal, Spontaneous, Apgar1: None, Apgar5: None, Living: Living, Birth Comments: None    # 3 - Date: 03/01/19, Sex: None, Weight: None, GA: None, Type: None, Apgar1: None, Apgar5: None, Living: None, Birth Comments: None    # 4 - Date: 12/22/19, Sex: Male, Weight: 3856 g (8 lb 8 oz), GA: None, Type: Vaginal, Spontaneous, Apgar1: None, Apgar5: None, Living: Living, Birth Comments: None    # 5 - Date: 05/2020, Sex: None, Weight: None, GA: None, Type: None, Apgar1: None, Apgar5: None, Living: None, Birth Comments: None    # 6 - Date: 04/03/21, Sex: Male, Weight: 3685 g (8 lb 2 oz), GA: None, Type: Vaginal, Spontaneous, Apgar1: None, Apgar5: None, Living: Living, Birth Comments: None    # 7 - Date: 06/13/24, Sex: Male, Weight: 3810 g (8 lb 6.4 oz), GA: 39w0d, Type: Vaginal, Spontaneous, Apgar1: 8, Apgar5: 9, Living: Living, Birth Comments: None   Previouse breast reduction surgery? No    Lactation history:   Has patient previously breast fed: Yes   How long had patient previously breast fed: 3 - 11 months   Previous breast feeding complications: None     Past Surgical History:   Procedure Laterality Date    FRACTURE SURGERY  2015    right hand       Birth information:  YOB: 2024   Time of birth: 5:13 PM   Sex: male   Delivery type: Vaginal, Spontaneous   Birth Weight: 3810 g (8 lb 6.4 oz)   Percent of Weight Change: 0%     Gestational Age: 39w0d    [unfilled]    Assessment     Breast and nipple assessment:  Denies need for assistance    [place] Assessment: sleepy    Feeding assessment: feeding well as per Mom & Staff    LATCH:  Latch: Grasps breast, tongue down, lips flanged, rhythmic sucking   Audible Swallowing: A few with stimulation   Type of Nipple: Everted (After stimulation)   Comfort (Breast/Nipple): Soft/non-tender   Hold (Positioning): Partial assist, teach one side, mother does other, staff holds   LATCH Score: 8          Feeding recommendations:  breast feed on demand    In to see family Review of positioning and alignment. Mom with experience is encouraged to:     - Bring baby up to the breast (use of pillows to elevate so baby's torso is against mom's breasts)   - Skin to skin for feedings with top hand exposed to show signs of satiation   - Chin deep into breast tissue (make baby look up to the nipple)   - nose aligned to the nipple   -Wait for wide gape, drag chin on the breast so nipple is aimed at the upper, back palate  - Cheek should be touching breast   - Deep, firm hold of baby with ear, shoulder, hip alignment    Also reviewed Ready, Set Baby and  discharge breastfeeding booklets including the feeding log. Emphasized 8 or more (12) feedings in a 24 hour period, what to expect for the number of diapers per day of life and the progression of properties of the  stooling pattern.    List of reasons to call a lactation consultant.  Feeding logs  Feeding cues  Hand expression  Baby's Second day (cluster feeding)  Breastfeeding and Your Lifestyle (Medications, Alcohol, Caffeine, Smoking, Street Drugs, Methadone)  First Two Weeks Survival Guide for Breastfeeding  Breast Changes  Physical Therapy  Storage and Handling of Breast milk  How to Keep Your Breast Pump Kit Clean  The Employed Breastfeeding Mother  Mixed feeding  Bottle feeding like breastfeeding (paced bottle feeding)  astfeeding and your lifestyle, storage and  preparation of breast milk, how to keep you breast pump clean, the employed breastfeeding mother and paced bottle feeding handouts.     Booklet included Breastfeeding Resources for after discharge including access to the number for the [facility].      Encouraged parents to call for assistance, questions, and concerns about breastfeeding.  Extension provided.      Deysi Manuel RN 6/14/2024 4:26 PM

## 2024-06-15 NOTE — PLAN OF CARE
Problem: PAIN - ADULT  Goal: Verbalizes/displays adequate comfort level or baseline comfort level  Description: Interventions:  - Encourage patient to monitor pain and request assistance  - Assess pain using appropriate pain scale  - Administer analgesics based on type and severity of pain and evaluate response  - Implement non-pharmacological measures as appropriate and evaluate response  - Consider cultural and social influences on pain and pain management  - Notify physician/advanced practitioner if interventions unsuccessful or patient reports new pain  Outcome: Completed     Problem: INFECTION - ADULT  Goal: Absence or prevention of progression during hospitalization  Description: INTERVENTIONS:  - Assess and monitor for signs and symptoms of infection  - Monitor lab/diagnostic results  - Monitor all insertion sites, i.e. indwelling lines, tubes, and drains  - Monitor endotracheal if appropriate and nasal secretions for changes in amount and color  - [place] appropriate cooling/warming therapies per order  - Administer medications as ordered  - Instruct and encourage patient and family to use good hand hygiene technique  - Identify and instruct in appropriate isolation precautions for identified infection/condition  Outcome: Completed  Goal: Absence of fever/infection during neutropenic period  Description: INTERVENTIONS:  - Monitor WBC    Outcome: Completed     Problem: SAFETY ADULT  Goal: Patient will remain free of falls  Description: INTERVENTIONS:  - Educate patient/family on patient safety including physical limitations  - Instruct patient to call for assistance with activity   - Consult OT/PT to assist with strengthening/mobility   - Keep Call bell within reach  - Keep bed low and locked with side rails adjusted as appropriate  - Keep care items and personal belongings within reach  - Initiate and maintain comfort rounds  - Make Fall Risk Sign visible to staff  - Offer Toileting every  Hours, in  advance of need  - Initiate/Maintain alarm  - Obtain necessary fall risk management equipment:   - Apply yellow socks and bracelet for high fall risk patients  - Consider moving patient to room near nurses station  Outcome: Completed  Goal: Maintain or return to baseline ADL function  Description: INTERVENTIONS:  -  Assess patient's ability to carry out ADLs; assess patient's baseline for ADL function and identify physical deficits which impact ability to perform ADLs (bathing, care of mouth/teeth, toileting, grooming, dressing, etc.)  - Assess/evaluate cause of self-care deficits   - Assess range of motion  - Assess patient's mobility; develop plan if impaired  - Assess patient's need for assistive devices and provide as appropriate  - Encourage maximum independence but intervene and supervise when necessary  - Involve family in performance of ADLs  - Assess for home care needs following discharge   - Consider OT consult to assist with ADL evaluation and planning for discharge  - Provide patient education as appropriate  Outcome: Completed  Goal: Maintains/Returns to pre admission functional level  Description: INTERVENTIONS:  - Perform AM-PAC 6 Click Basic Mobility/ Daily Activity assessment daily.  - Set and communicate daily mobility goal to care team and patient/family/caregiver.   - Collaborate with rehabilitation services on mobility goals if consulted  - Perform Range of Motion times a day.  - Reposition patient every  hours.  - Dangle patient  times a day  - Stand patient  times a day  - Ambulate patient  times a day  - Out of bed to chair  times a day   - Out of bed for meals  times a day  - Out of bed for toileting  - Record patient progress and toleration of activity level   Outcome: Completed     Problem: DISCHARGE PLANNING  Goal: Discharge to home or other facility with appropriate resources  Description: INTERVENTIONS:  - Identify barriers to discharge w/patient and caregiver  - Arrange for needed  discharge resources and transportation as appropriate  - Identify discharge learning needs (meds, wound care, etc.)  - Arrange for interpretive services to assist at discharge as needed  - Refer to Case Management Department for coordinating discharge planning if the patient needs post-hospital services based on physician/advanced practitioner order or complex needs related to functional status, cognitive ability, or social support system  Outcome: Completed     Problem: Knowledge Deficit  Goal: Patient/family/caregiver demonstrates understanding of disease process, treatment plan, medications, and discharge instructions  Description: Complete learning assessment and assess knowledge base.  Interventions:  - Provide teaching at level of understanding  - Provide teaching via preferred learning methods  Outcome: Completed

## 2024-06-17 NOTE — UTILIZATION REVIEW
"Mother and baby discharged on 2024      NOTIFICATION OF INPATIENT ADMISSION   MATERNITY/DELIVERY AUTHORIZATION REQUEST   SERVICING FACILITY:   [facility] - L&D, [place], NICU  [address] [place] [address]  Tax ID: [id number]  NPI: [id number] ATTENDING PROVIDER:  Attending Name and NPI#: Rachel Dejesus Md [[id number]]  Address: [address] [place] [address]  Phone: [phone]     ADMISSION INFORMATION:  Place of Service: Inpatient [facility]  Place of Service Code: 21  Inpatient Admission Date/Time: 24  1:27 PM  Discharge Date/Time: 2024  8:45 PM  Admitting Diagnosis Code/Description:  Encounter for induction of labor [Z34.90]   Mother: Ramez Scott 1995 Estimated Date of Delivery: 24  Delivering clinician: Rachel Dejesus   OB History          7    Para   5    Term   5            AB   2    Living   5         SAB   2    IAB        Ectopic        Multiple   0    Live Births   5               [place] Name & MRN:   Information for the patient's :  Newton Larry [[id number]]   [place] Delivery Information:  Sex: male  Delivered 2024 5:13 PM by Vaginal, Spontaneous; Gestational Age: 39w0d     Measurements:  Weight: 8 lb 6.4 oz (3810 g);  Height: 21\"    APGAR 1 minute 5 minutes 10 minutes   Totals: 8 9       UTILIZATION REVIEW CONTACT:  Ninfa Pereira, Utilization   Network Utilization Review Department  Phone: [phone]  Fax [phone]  Email: [email or web address]  Contact for approvals/pending authorizations, clinical reviews, and discharge.   PHYSICIAN ADVISORY SERVICES:  Medical Necessity Denial & [place] Review  Phone: [phone]  Fax: [phone]  Email: [email or web address]   DISCHARGE SUPPORT TEAM:  For Patients Discharge Needs & Updates  Phone: [phone] opt. 2 Fax: [phone]  Email: [email or web address]    "

## 2024-06-18 ENCOUNTER — TELEPHONE (OUTPATIENT)
Dept: OBGYN CLINIC | Facility: MEDICAL CENTER | Age: 29
End: 2024-06-18

## 2024-06-18 NOTE — TELEPHONE ENCOUNTER
Left message on vm to return call. Please assist patient with scheduling postpartum visit. Thank you.

## 2024-06-20 ENCOUNTER — TELEPHONE (OUTPATIENT)
Dept: OBGYN CLINIC | Facility: MEDICAL CENTER | Age: 29
End: 2024-06-20

## 2024-06-20 NOTE — TELEPHONE ENCOUNTER
POSTPARTUM PHONE CALL ASSESSMENT    Date of Delivery: 24    Delivering Provider: Dr Dejesus    Mode:     Delivery Notes/Complications: none noted - intact perineum     Do you still have bleeding/pain?  If so, how much/how severe?   Bleeding has slowed    Regular BMs/Urination? Regular BM/urination    Breastfeeding/Formula/Both? breast    How are you doing emotionally? Felling well emotionally   EPDS Score: 0    Do you have any other questions or concerns for us or your provider? Denies concerns    Have you scheduled the pediatrician appointment with pediatrician? Was seen 24 at peds    Do you have a postpartum visit scheduled? Scheduled today    Date scheduled: 24 Provider: Dr Rosenbaum

## 2024-06-21 LAB — PLACENTA IN STORAGE: NORMAL

## 2024-07-16 ENCOUNTER — POSTPARTUM VISIT (OUTPATIENT)
Dept: OBGYN CLINIC | Facility: MEDICAL CENTER | Age: 29
End: 2024-07-16
Payer: COMMERCIAL

## 2024-07-16 VITALS
BODY MASS INDEX: 51.99 KG/M2 | SYSTOLIC BLOOD PRESSURE: 122 MMHG | HEIGHT: 62 IN | WEIGHT: 282.5 LBS | DIASTOLIC BLOOD PRESSURE: 80 MMHG

## 2024-07-16 DIAGNOSIS — R63.8 INCREASED BMI: ICD-10-CM

## 2024-07-16 PROCEDURE — 99213 OFFICE O/P EST LOW 20 MIN: CPT | Performed by: OBSTETRICS & GYNECOLOGY

## 2024-07-16 NOTE — PROGRESS NOTES
"Postpartum Visit   [facility] [address], [place], [place]    Assessment/Plan:  Ramez is a 28 y.o. year old  who presents for postpartum visit.    Routine Postpartum Care  - Normal postpartum exam  - Contraception: IUD will return for Paraguard insertion  - Depression Screen: negative  - Feeding: breast  - Psychosocial support: appropriate  - Patient Education: Postpartum resources including Pelvic Health PT and Baby & Me  - Cervical cancer screening needed, will perform at IUD insertion appoiintment      Additional Problems:  1. Increased BMI  -     Ambulatory Referral to Weight Management; Future        Subjective:     CC: Postpartum visit    Ramez Scott is a 28 y.o. female  who presents for a postpartum visit.     She is approximately 4 weeks postpartum following a  on 2024 at 39 weeks. Postpartum course has been uncomplicated. Bleeding no bleeding. Bowel function is normal. Bladder function is normal. Patient is sexually active. Contraception method is none. Postpartum depression screening: negative.  Discussed weight management interested in pursuing help in losing weight  Baby's course has been uncomplicated. Baby is feeding by breast.     The following portions of the patient's history were reviewed and updated as appropriate: allergies, current medications, past family history, past medical history, obstetric history, gynecologic history, past social history, past surgical history and problem list.      Objective:  /80   Ht 5' 2\" (1.575 m)   Wt 128 kg (282 lb 8 oz)   LMP 2023 (Exact Date)   Breastfeeding Yes   BMI 51.67 kg/m²   Pregravid Weight/BMI: 122 kg (270 lb) (BMI 49.37)  Current Weight: 128 kg (282 lb 8 oz)   Total Weight Gain: 10.4 kg (23 lb)   Pre-Sanna Vitals      Flowsheet Row Most Recent Value   Prenatal Assessment    Prenatal Vitals    Blood Pressure 122/80   Weight - Scale 128 kg (282 lb 8 oz)   Urine Albumin/Glucose  "   Dilation/Effacement/Station    Vaginal Drainage    Edema              General: Well appearing, no distress.  Mood and affect: Appropriate.  Respiratory: Unlabored breathing. Clear to auscultate.  Cardiovascular: Regular rate and rhythm.  Abdomen: Soft, nontender  Extremities: Warm and well perfused.  Non tender.

## 2025-02-03 ENCOUNTER — OFFICE VISIT (OUTPATIENT)
Dept: URGENT CARE | Facility: CLINIC | Age: 30
End: 2025-02-03
Payer: COMMERCIAL

## 2025-02-03 VITALS
HEIGHT: 62 IN | OXYGEN SATURATION: 98 % | SYSTOLIC BLOOD PRESSURE: 132 MMHG | DIASTOLIC BLOOD PRESSURE: 68 MMHG | TEMPERATURE: 98.1 F | RESPIRATION RATE: 20 BRPM | WEIGHT: 291 LBS | BODY MASS INDEX: 53.55 KG/M2 | HEART RATE: 86 BPM

## 2025-02-03 DIAGNOSIS — B37.9 CANDIDA INFECTION: Primary | ICD-10-CM

## 2025-02-03 PROCEDURE — S9088 SERVICES PROVIDED IN URGENT: HCPCS

## 2025-02-03 PROCEDURE — 99213 OFFICE O/P EST LOW 20 MIN: CPT

## 2025-02-03 RX ORDER — MICONAZOLE NITRATE 20 MG/G
CREAM TOPICAL 2 TIMES DAILY
Qty: 28 G | Refills: 0 | Status: SHIPPED | OUTPATIENT
Start: 2025-02-03

## 2025-02-03 NOTE — PROGRESS NOTES
[facility]        NAME: Ramez Scott is a 29 y.o. female  : 1995    MRN: [id number]  DATE: February 3, 2025  TIME: 1:20 PM    Assessment and Plan   Candida infection [B37.9]  1. Candida infection  miconazole 2 % cream        Will treat with miconazole cream. Instructed patient to follow-up with PCP for no improvement or worsening of symptoms.  Patient educated on red flag symptoms and when to proceed to the ED.  Patient agreeable and understands current treatment plan.     Patient Instructions     Patient Instructions   Please apply miconazole cream after feeds.  Keep your nipples as dry as possible.  Wear cotton bras and wash them often.  You may try nursing pads or nipple shells.  These create a space to keep your nipples dry while wearing clothing.  They can also help with pain by clipping clothing off your nipples.    Provide separate bathing towels for everyone in the family.  Wash your towels with hot water and make sure you wash your hands often, especially after changing her baby's diaper or using the toilet.    Please ensure to wash and sterilize any pacifiers, reasonable nipple shields, breast pump parts, and toys often.     If your symptoms do not improve with our current treatment plan or worsen, please schedule an appointment with your PCP or proceed to the ED.        Follow up with PCP in 3-5 days.  Proceed to  ER if symptoms worsen.    Chief Complaint     Chief Complaint   Patient presents with   • Breast Problem     +thrush on bilateral breasts discovered 2 days ago. Breastfeeding.         History of Present Illness       29-year-old female presents to the clinic for evaluation of irritated nipples x 2 days.  Patient reports her nipples have been burning after she feeds her infant son.  She also reports some mild redness but denies any white spots.  Patient reports applying cool compresses to the nipples with mild relief of symptoms.  She presents to the clinic with her infant  son who she believes has thrush.  Of note, she did test positive for COVID x 3 days ago and does report associated symptoms including fevers, chills, sinus congestion, runny nose, dry cough, body aches and headaches.  She does report the symptoms are improved.          Review of Systems   Review of Systems   Constitutional:  Positive for chills and fever. Negative for appetite change.   HENT:  Positive for congestion and rhinorrhea. Negative for sore throat.    Eyes:  Negative for discharge and redness.   Respiratory:  Positive for cough (dry). Negative for shortness of breath.    Cardiovascular:  Negative for chest pain.   Gastrointestinal:  Negative for diarrhea, nausea and vomiting.   Genitourinary:  Negative for decreased urine volume.   Musculoskeletal:  Positive for arthralgias and myalgias.   Skin:  Positive for color change (Reddended, irritated nipples).   Neurological:  Positive for headaches. Negative for dizziness and light-headedness.         Current Medications       Current Outpatient Medications:   •  miconazole 2 % cream, Apply topically 2 (two) times a day, Disp: 28 g, Rfl: 0  •  acetaminophen (TYLENOL) 325 mg tablet, Take 2 tablets (650 mg total) by mouth every 4 (four) hours as needed for mild pain (Patient not taking: Reported on 2/3/2025), Disp: , Rfl:   •  benzocaine-menthol-lanolin-aloe (DERMOPLAST) 20-0.5 % topical spray, Apply 1 Application topically 4 (four) times a day as needed for mild pain (Patient not taking: Reported on 7/16/2024), Disp: 56 g, Rfl: 0  •  Blood Glucose Monitoring Suppl (OneTouch Verio Flex System) w/Device KIT, Test x4 Daily or as instructed, Disp: 1 kit, Rfl: 0  •  hydrocortisone 1 % cream, Apply 1 Application topically daily as needed for irritation (Patient not taking: Reported on 2/3/2025), Disp: , Rfl:   •  ibuprofen (MOTRIN) 600 mg tablet, Take 1 tablet (600 mg total) by mouth every 6 (six) hours (Patient not taking: Reported on 2/3/2025), Disp: 30 tablet, Rfl:  "0  •  witch hazel-glycerin (TUCKS) topical pad, Apply 1 Pad topically every 4 (four) hours as needed for irritation (Patient not taking: Reported on 2/3/2025), Disp: 50 each, Rfl: 0    Current Allergies     Allergies as of 02/03/2025   • (No Known Allergies)            The following portions of the patient's history were reviewed and updated as appropriate: allergies, current medications, past family history, past medical history, past social history, past surgical history and problem list.     Past Medical History:   Diagnosis Date   • Chlamydia     in the past   • Varicella     had vaccine       Past Surgical History:   Procedure Laterality Date   • FRACTURE SURGERY  2015    right hand       Family History   Problem Relation Age of Onset   • No Known Problems Mother    • No Known Problems Father    • No Known Problems Brother    • No Known Problems Daughter    • No Known Problems Son    • No Known Problems Son    • Cancer Maternal Grandmother    • Cancer Maternal Grandfather    • Cancer Paternal Grandmother    • Cancer Paternal Grandfather          Medications have been verified.        Objective   /68   Pulse 86   Temp 98.1 °F (36.7 °C)   Resp 20   Ht 5' 2\" (1.575 m)   Wt 132 kg (291 lb)   SpO2 98%   Breastfeeding Yes   BMI 53.22 kg/m²        Physical Exam     Physical Exam  Vitals and nursing note reviewed.   Constitutional:       Appearance: Normal appearance.   HENT:      Head: Normocephalic and atraumatic.   Cardiovascular:      Rate and Rhythm: Normal rate and regular rhythm.      Pulses: Normal pulses.      Heart sounds: Normal heart sounds.   Pulmonary:      Effort: Pulmonary effort is normal.      Breath sounds: Normal breath sounds.   Abdominal:      General: Bowel sounds are normal.      Palpations: Abdomen is soft.   Skin:     General: Skin is warm and dry.      Findings: Erythema (b/l nipples (no cracking, open areas, discharge, or white patches noted)) present.   Neurological:      " General: No focal deficit present.      Mental Status: She is alert.   Psychiatric:         Mood and Affect: Mood normal.         Behavior: Behavior normal.

## 2025-02-03 NOTE — PATIENT INSTRUCTIONS
Please apply miconazole cream after feeds.  Keep your nipples as dry as possible.  Wear cotton bras and wash them often.  You may try nursing pads or nipple shells.  These create a space to keep your nipples dry while wearing clothing.  They can also help with pain by clipping clothing off your nipples.    Provide separate bathing towels for everyone in the family.  Wash your towels with hot water and make sure you wash your hands often, especially after changing her baby's diaper or using the toilet.    Please ensure to wash and sterilize any pacifiers, reasonable nipple shields, breast pump parts, and toys often.     If your symptoms do not improve with our current treatment plan or worsen, please schedule an appointment with your PCP or proceed to the ED.

## 2025-02-07 ENCOUNTER — OFFICE VISIT (OUTPATIENT)
Dept: INTERNAL MEDICINE CLINIC | Facility: CLINIC | Age: 30
End: 2025-02-07
Payer: COMMERCIAL

## 2025-02-07 ENCOUNTER — APPOINTMENT (OUTPATIENT)
Dept: RADIOLOGY | Facility: CLINIC | Age: 30
End: 2025-02-07
Payer: COMMERCIAL

## 2025-02-07 VITALS
TEMPERATURE: 97.5 F | DIASTOLIC BLOOD PRESSURE: 70 MMHG | HEIGHT: 62 IN | OXYGEN SATURATION: 98 % | BODY MASS INDEX: 53.59 KG/M2 | SYSTOLIC BLOOD PRESSURE: 132 MMHG | WEIGHT: 291.2 LBS | HEART RATE: 77 BPM

## 2025-02-07 DIAGNOSIS — Z86.32 HISTORY OF GESTATIONAL DIABETES: ICD-10-CM

## 2025-02-07 DIAGNOSIS — Z86.19 HISTORY OF CHLAMYDIA: Primary | ICD-10-CM

## 2025-02-07 DIAGNOSIS — R06.02 SOB (SHORTNESS OF BREATH): ICD-10-CM

## 2025-02-07 DIAGNOSIS — Z13.1 SCREENING FOR DIABETES MELLITUS: ICD-10-CM

## 2025-02-07 DIAGNOSIS — N96 HISTORY OF MULTIPLE MISCARRIAGES: ICD-10-CM

## 2025-02-07 DIAGNOSIS — Z13.29 SCREENING FOR THYROID DISORDER: ICD-10-CM

## 2025-02-07 DIAGNOSIS — Z13.220 SCREENING FOR HYPERLIPIDEMIA: ICD-10-CM

## 2025-02-07 DIAGNOSIS — Z80.0 FAMILY HISTORY OF LIVER CANCER: ICD-10-CM

## 2025-02-07 DIAGNOSIS — Z76.89 ENCOUNTER TO ESTABLISH CARE WITH NEW DOCTOR: ICD-10-CM

## 2025-02-07 DIAGNOSIS — Z00.00 WELL ADULT EXAM: ICD-10-CM

## 2025-02-07 DIAGNOSIS — Z12.4 SCREENING FOR CERVICAL CANCER: ICD-10-CM

## 2025-02-07 DIAGNOSIS — Z80.1 FAMILY HISTORY OF LUNG CANCER: ICD-10-CM

## 2025-02-07 DIAGNOSIS — R07.89 CHEST WALL PAIN: ICD-10-CM

## 2025-02-07 DIAGNOSIS — Z13.0 SCREENING FOR DEFICIENCY ANEMIA: ICD-10-CM

## 2025-02-07 DIAGNOSIS — Z80.0 FAMILY HISTORY OF PANCREATIC CANCER: ICD-10-CM

## 2025-02-07 DIAGNOSIS — Z23 ENCOUNTER FOR IMMUNIZATION: ICD-10-CM

## 2025-02-07 DIAGNOSIS — Z87.891 FORMER SMOKER: ICD-10-CM

## 2025-02-07 DIAGNOSIS — E66.01 MORBID OBESITY WITH BMI OF 50.0-59.9, ADULT (HCC): ICD-10-CM

## 2025-02-07 PROBLEM — O09.293 HISTORY OF SHOULDER DYSTOCIA IN PRIOR PREGNANCY, CURRENTLY PREGNANT IN THIRD TRIMESTER: Status: RESOLVED | Noted: 2024-04-30 | Resolved: 2025-02-07

## 2025-02-07 PROBLEM — O99.210 OBESITY IN PREGNANCY, ANTEPARTUM: Status: RESOLVED | Noted: 2024-04-30 | Resolved: 2025-02-07

## 2025-02-07 PROBLEM — Z34.90 ENCOUNTER FOR INDUCTION OF LABOR: Status: RESOLVED | Noted: 2024-06-13 | Resolved: 2025-02-07

## 2025-02-07 PROBLEM — D69.6 THROMBOCYTOPENIA AFFECTING PREGNANCY (HCC): Status: RESOLVED | Noted: 2024-05-01 | Resolved: 2025-02-07

## 2025-02-07 PROBLEM — O36.60X0 FETAL MACROSOMIA AFFECTING MANAGEMENT OF MOTHER, ANTEPARTUM: Status: RESOLVED | Noted: 2024-05-01 | Resolved: 2025-02-07

## 2025-02-07 PROBLEM — O99.810 ABNORMAL GLUCOSE AFFECTING PREGNANCY: Status: RESOLVED | Noted: 2024-04-30 | Resolved: 2025-02-07

## 2025-02-07 PROBLEM — O99.119 THROMBOCYTOPENIA AFFECTING PREGNANCY (HCC): Status: RESOLVED | Noted: 2024-05-01 | Resolved: 2025-02-07

## 2025-02-07 PROCEDURE — 99385 PREV VISIT NEW AGE 18-39: CPT | Performed by: INTERNAL MEDICINE

## 2025-02-07 PROCEDURE — 71046 X-RAY EXAM CHEST 2 VIEWS: CPT

## 2025-02-07 NOTE — PROGRESS NOTES
Name: Ramez Scott      : 1995      MRN: [id number]  Encounter Provider: Grant Salinas DO  Encounter Date: 2025   Encounter department: [facility]  :  Assessment & Plan  Encounter for immunization    Orders:    influenza vaccine preservative-free 0.5 mL IM (Fluzone, Afluria, Fluarix, Flulaval)    Pneumococcal Conjugate Vaccine 20-valent (Pcv20)    Screening for cervical cancer         Morbid obesity with BMI of 50.0-59.9, adult (HCC)      Orders:    TSH, 3rd generation; Future    Ambulatory Referral to Weight Management; Future    Well adult exam    Orders:    CBC and differential; Future    Comprehensive metabolic panel; Future    Hepatitis C antibody; Future    Lipid Panel with Direct LDL reflex; Future    Hemoglobin A1C; Future    TSH, 3rd generation; Future    Encounter to establish care with new doctor         Screening for deficiency anemia    Orders:    CBC and differential; Future    Screening for thyroid disorder    Orders:    TSH, 3rd generation; Future    Screening for diabetes mellitus    Orders:    Hemoglobin A1C; Future    Screening for hyperlipidemia         Mother currently breast-feeding         Family history of liver cancer         Family history of lung cancer         Family history of pancreatic cancer         Former smoker    Orders:    D-dimer, quantitative; Future    History of gestational diabetes    Orders:    Hemoglobin A1C; Future    SOB (shortness of breath)    Orders:    CBC and differential; Future    Comprehensive metabolic panel; Future    D-dimer, quantitative; Future    XR chest pa and lateral; Future    Chest wall pain    Orders:    D-dimer, quantitative; Future    XR chest pa and lateral; Future    History of chlamydia         History of multiple miscarriages         A/P: Stable and PE not overly impressive for a cause of her SOB and ACW pain. No edema or leg pain, but h/o multiple miscarriages and former smoker with her wt means increased risk  for DVT/PE, but s/s have been going on for sometime. Will check CXR and d-dimer. Suspect a restrict degree of emerson disease. May need PFT's. Will check routine labs, including Hep C and HIV. Discussed BMI and will give info on diet and exercise. Has an appt with bariatric medicine. Discussed vaccines defers all.  Start SBE. Will check an alpha since an ex smoker.Modify risks for diabetes given h/o gestational diabetes. Keep an appt with an eye doc and DDS. Continue current treatment pending the labs and RTC three weeks for f/u labs, CXR, and ?need for EKG and PFT's. .       History of Present Illness   WF, w/o any former PCP, presents to establish. PMH includes gestational dm, chlamydia, and former smoker.. PSH of right hand sx. Former Smoker and denies ETOH. Doing ok and no c/o's.and is 8 months s/p delivery. Currently breastfeeding. Reports several months of intermittent bilat rib pain with ?SOB. No wheezing. With and w/o activity. Last several minutes.  Remains active w/o difficulty and no falls otherwise.. Denies depression. No suicidal or violent ideations. 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 No recent travel. No fever, chills, or sweats. No unexplained wt change. Denies CP,  palpitations, edema, orthopnea, or PND. No sz or syncope. No changes in bowel or bladder habits. No trouble swallowing. Appetite and sleep are good. Overdue to see both a DDS and an eye doctor.  Currently due for labs and vaccines. .              Review of Systems   Constitutional:  Negative for activity change, chills, diaphoresis, fatigue and fever.   HENT: Negative.     Eyes:  Negative for visual disturbance.   Respiratory:  Positive for chest tightness and shortness of breath. Negative for cough and wheezing.    Cardiovascular:  Negative for chest pain, palpitations and leg swelling.   Gastrointestinal:  Negative for abdominal pain, constipation, diarrhea, nausea and vomiting.   Endocrine: Negative for cold intolerance and heat intolerance.  "  Genitourinary:  Negative for difficulty urinating, dysuria and frequency.   Musculoskeletal:  Negative for arthralgias, gait problem and myalgias.   Neurological:  Negative for dizziness, seizures, syncope, weakness, light-headedness and headaches.   Psychiatric/Behavioral:  Negative for confusion, dysphoric mood and sleep disturbance. The patient is not nervous/anxious.        Objective   /70   Pulse 77   Temp 97.5 °F (36.4 °C)   Ht 5' 2\" (1.575 m)   Wt 132 kg (291 lb 3.2 oz)   SpO2 98%   Breastfeeding Unknown   BMI 53.26 kg/m²      Physical Exam  Vitals and nursing note reviewed.   Constitutional:       General: She is not in acute distress.     Appearance: Normal appearance. She is obese. She is not ill-appearing.   HENT:      Head: Normocephalic and atraumatic.      Mouth/Throat:      Mouth: Mucous membranes are moist.   Eyes:      Extraocular Movements: Extraocular movements intact.      Conjunctiva/sclera: Conjunctivae normal.      Pupils: Pupils are equal, round, and reactive to light.   Neck:      Vascular: No carotid bruit.   Cardiovascular:      Rate and Rhythm: Normal rate and regular rhythm.      Heart sounds: Normal heart sounds. No murmur heard.  Pulmonary:      Effort: Pulmonary effort is normal. No respiratory distress.      Breath sounds: Normal breath sounds. No wheezing, rhonchi or rales.   Abdominal:      General: Bowel sounds are normal. There is no distension.      Palpations: Abdomen is soft. There is no mass.      Tenderness: There is no abdominal tenderness. There is no right CVA tenderness, left CVA tenderness, guarding or rebound.   Musculoskeletal:         General: No swelling, tenderness or deformity. Normal range of motion.      Cervical back: Normal range of motion and neck supple. No rigidity or tenderness.      Right lower leg: No edema.      Left lower leg: No edema.   Lymphadenopathy:      Cervical: No cervical adenopathy.   Neurological:      General: No focal " deficit present.      Mental Status: She is alert and oriented to person, place, and time. Mental status is at baseline.      Cranial Nerves: No cranial nerve deficit.      Motor: No weakness.      Coordination: Coordination normal.      Gait: Gait normal.      Deep Tendon Reflexes: Reflexes normal.   Psychiatric:         Mood and Affect: Mood normal.         Thought Content: Thought content normal.         Judgment: Judgment normal.

## 2025-02-12 ENCOUNTER — RESULTS FOLLOW-UP (OUTPATIENT)
Dept: INTERNAL MEDICINE CLINIC | Facility: CLINIC | Age: 30
End: 2025-02-12

## 2025-02-20 ENCOUNTER — OFFICE VISIT (OUTPATIENT)
Dept: BARIATRICS | Facility: CLINIC | Age: 30
End: 2025-02-20
Payer: COMMERCIAL

## 2025-02-20 ENCOUNTER — TELEPHONE (OUTPATIENT)
Dept: BARIATRICS | Facility: CLINIC | Age: 30
End: 2025-02-20

## 2025-02-20 ENCOUNTER — APPOINTMENT (OUTPATIENT)
Dept: LAB | Facility: CLINIC | Age: 30
End: 2025-02-20
Payer: COMMERCIAL

## 2025-02-20 ENCOUNTER — TELEPHONE (OUTPATIENT)
Age: 30
End: 2025-02-20

## 2025-02-20 VITALS
DIASTOLIC BLOOD PRESSURE: 72 MMHG | RESPIRATION RATE: 20 BRPM | SYSTOLIC BLOOD PRESSURE: 118 MMHG | WEIGHT: 292 LBS | TEMPERATURE: 98.2 F | BODY MASS INDEX: 53.73 KG/M2 | HEART RATE: 86 BPM | HEIGHT: 62 IN | OXYGEN SATURATION: 97 %

## 2025-02-20 DIAGNOSIS — R07.89 CHEST WALL PAIN: ICD-10-CM

## 2025-02-20 DIAGNOSIS — D69.6 BENIGN GESTATIONAL THROMBOCYTOPENIA IN THIRD TRIMESTER (HCC): ICD-10-CM

## 2025-02-20 DIAGNOSIS — Z00.00 WELL ADULT EXAM: ICD-10-CM

## 2025-02-20 DIAGNOSIS — Z13.0 SCREENING FOR DEFICIENCY ANEMIA: ICD-10-CM

## 2025-02-20 DIAGNOSIS — R06.02 SOB (SHORTNESS OF BREATH): ICD-10-CM

## 2025-02-20 DIAGNOSIS — Z87.891 FORMER SMOKER: ICD-10-CM

## 2025-02-20 DIAGNOSIS — E66.01 MORBID OBESITY WITH BMI OF 50.0-59.9, ADULT (HCC): Primary | ICD-10-CM

## 2025-02-20 DIAGNOSIS — Z86.32 HISTORY OF GESTATIONAL DIABETES: ICD-10-CM

## 2025-02-20 DIAGNOSIS — O99.113 BENIGN GESTATIONAL THROMBOCYTOPENIA IN THIRD TRIMESTER (HCC): ICD-10-CM

## 2025-02-20 DIAGNOSIS — R63.8 INCREASED BMI: ICD-10-CM

## 2025-02-20 DIAGNOSIS — Z13.29 SCREENING FOR THYROID DISORDER: ICD-10-CM

## 2025-02-20 DIAGNOSIS — Z13.1 SCREENING FOR DIABETES MELLITUS: ICD-10-CM

## 2025-02-20 DIAGNOSIS — E66.01 MORBID OBESITY WITH BMI OF 50.0-59.9, ADULT (HCC): ICD-10-CM

## 2025-02-20 LAB
ALBUMIN SERPL BCG-MCNC: 4.3 G/DL (ref 3.5–5)
ALP SERPL-CCNC: 97 U/L (ref 34–104)
ALT SERPL W P-5'-P-CCNC: 15 U/L (ref 7–52)
ANION GAP SERPL CALCULATED.3IONS-SCNC: 9 MMOL/L (ref 4–13)
AST SERPL W P-5'-P-CCNC: 13 U/L (ref 13–39)
BASOPHILS # BLD AUTO: 0.04 THOUSANDS/ΜL (ref 0–0.1)
BASOPHILS NFR BLD AUTO: 1 % (ref 0–1)
BILIRUB SERPL-MCNC: 0.51 MG/DL (ref 0.2–1)
BUN SERPL-MCNC: 13 MG/DL (ref 5–25)
CALCIUM SERPL-MCNC: 9.1 MG/DL (ref 8.4–10.2)
CHLORIDE SERPL-SCNC: 104 MMOL/L (ref 96–108)
CHOLEST SERPL-MCNC: 135 MG/DL (ref ?–200)
CO2 SERPL-SCNC: 27 MMOL/L (ref 21–32)
CREAT SERPL-MCNC: 0.79 MG/DL (ref 0.6–1.3)
D DIMER PPP FEU-MCNC: <0.27 UG/ML FEU
EOSINOPHIL # BLD AUTO: 0.15 THOUSAND/ΜL (ref 0–0.61)
EOSINOPHIL NFR BLD AUTO: 2 % (ref 0–6)
ERYTHROCYTE [DISTWIDTH] IN BLOOD BY AUTOMATED COUNT: 13.1 % (ref 11.6–15.1)
EST. AVERAGE GLUCOSE BLD GHB EST-MCNC: 117 MG/DL
GFR SERPL CREATININE-BSD FRML MDRD: 101 ML/MIN/1.73SQ M
GLUCOSE P FAST SERPL-MCNC: 90 MG/DL (ref 65–99)
HBA1C MFR BLD: 5.7 %
HCT VFR BLD AUTO: 38.1 % (ref 34.8–46.1)
HDLC SERPL-MCNC: 46 MG/DL
HGB BLD-MCNC: 12.3 G/DL (ref 11.5–15.4)
IMM GRANULOCYTES # BLD AUTO: 0.02 THOUSAND/UL (ref 0–0.2)
IMM GRANULOCYTES NFR BLD AUTO: 0 % (ref 0–2)
LDLC SERPL CALC-MCNC: 77 MG/DL (ref 0–100)
LYMPHOCYTES # BLD AUTO: 2.27 THOUSANDS/ΜL (ref 0.6–4.47)
LYMPHOCYTES NFR BLD AUTO: 28 % (ref 14–44)
MCH RBC QN AUTO: 27.3 PG (ref 26.8–34.3)
MCHC RBC AUTO-ENTMCNC: 32.3 G/DL (ref 31.4–37.4)
MCV RBC AUTO: 85 FL (ref 82–98)
MONOCYTES # BLD AUTO: 0.47 THOUSAND/ΜL (ref 0.17–1.22)
MONOCYTES NFR BLD AUTO: 6 % (ref 4–12)
NEUTROPHILS # BLD AUTO: 5.18 THOUSANDS/ΜL (ref 1.85–7.62)
NEUTS SEG NFR BLD AUTO: 63 % (ref 43–75)
NRBC BLD AUTO-RTO: 0 /100 WBCS
PLATELET # BLD AUTO: 212 THOUSANDS/UL (ref 149–390)
PMV BLD AUTO: 12.1 FL (ref 8.9–12.7)
POTASSIUM SERPL-SCNC: 4.3 MMOL/L (ref 3.5–5.3)
PROT SERPL-MCNC: 7.2 G/DL (ref 6.4–8.4)
RBC # BLD AUTO: 4.51 MILLION/UL (ref 3.81–5.12)
SODIUM SERPL-SCNC: 140 MMOL/L (ref 135–147)
TRIGL SERPL-MCNC: 58 MG/DL (ref ?–150)
TSH SERPL DL<=0.05 MIU/L-ACNC: 1.82 UIU/ML (ref 0.45–4.5)
WBC # BLD AUTO: 8.13 THOUSAND/UL (ref 4.31–10.16)

## 2025-02-20 PROCEDURE — 86803 HEPATITIS C AB TEST: CPT

## 2025-02-20 PROCEDURE — 83036 HEMOGLOBIN GLYCOSYLATED A1C: CPT

## 2025-02-20 PROCEDURE — 85379 FIBRIN DEGRADATION QUANT: CPT

## 2025-02-20 PROCEDURE — 84443 ASSAY THYROID STIM HORMONE: CPT

## 2025-02-20 PROCEDURE — 99204 OFFICE O/P NEW MOD 45 MIN: CPT | Performed by: PHYSICIAN ASSISTANT

## 2025-02-20 PROCEDURE — 80061 LIPID PANEL: CPT

## 2025-02-20 PROCEDURE — 36415 COLL VENOUS BLD VENIPUNCTURE: CPT

## 2025-02-20 PROCEDURE — 80053 COMPREHEN METABOLIC PANEL: CPT

## 2025-02-20 PROCEDURE — 85025 COMPLETE CBC W/AUTO DIFF WBC: CPT

## 2025-02-20 RX ORDER — TIRZEPATIDE 2.5 MG/.5ML
2.5 INJECTION, SOLUTION SUBCUTANEOUS WEEKLY
Qty: 2 ML | Refills: 0 | Status: SHIPPED | OUTPATIENT
Start: 2025-02-20 | End: 2025-03-20

## 2025-02-20 NOTE — PATIENT INSTRUCTIONS
Go to [email or web address] for further information. Instructions on how to inject yourself are located on the website.    - Zebound was sent to your pharmacy. The prior authorization process will been done through our prior authorization team and can take up to 3-4 weeks to process through the insurance.     - Start Zepbound 2.5 mg subcutaneously weekly. After you have taken the second pen, please give me an update, as we will likely increase the dose the next month if you are tolerating it well.     - Side effects of Zepbound include nausea, vomiting, diarrhea, or constipation. Keep an eye on your heart rate while on Zepbound. If you resting heart rate is greater than 100 beats per minutes, please notify me. Please eat small frequent meals to help reduce nausea. Lemon water and saltine crackers may help with this. If you experience fever, nausea/vomiting, and pain radiating to your back this may be a sign of pancreatitis. Please have ED evaluation if this occurs.     - Please hold Zepbound for one week prior to a procedure such as surgery, upper endoscopy, or colonoscopy for risk of aspiration.     - Zepbound can reduce the effectiveness of oral hormonal birth control (birth control pills). Recommend a barrier backup method such as condoms to prevent pregnancy.     Call or message me after 2nd pen with any side effects and to increase dose!

## 2025-02-20 NOTE — ASSESSMENT & PLAN NOTE
- Discussed options of HealthyCORE-Intensive Lifestyle Intervention Program, Very Low Calorie Diet-VLCD, Conservative Program, Ashvin-En-Y Gastric Bypass, and Vertical Sleeve Gastrectomy and the role of weight loss medications.  - Initial weight loss goal of 5-10% weight loss for improved health  - Patient is interested in pursuing Conservative Program, Ashvin-En-Y Gastric Bypass, and Vertical Sleeve Gastrectomy  - Screening labs: Pending in system  - Calorie goal: 8427-5748 calories per day  - Start Zepbound 2.5mg weekly  - Patient 6 months postpartum and denies breastfeeding, discussed need for birth control while on medications  - Consider Phentermine if not covered, discussed need for EKG first  - Medication agreement signed: Yes  - Follow up in 2 months with me and follow up in [place] for surgical consult with Dr. Thorpe/Ko Tracey  - Dietician: Agreeable in the future

## 2025-02-20 NOTE — TELEPHONE ENCOUNTER
Patient had called and scheduled paraguard iud insertion for 4/4/25. Patient is on the wait list if a sooner appointment comes available and she wanted to make sure this would be approved for her. Please advise patient can be reached at [phone].

## 2025-02-20 NOTE — PROGRESS NOTES
Assessment/Plan:    Ramez Scott  is a 29 y.o. female with excess weight/obesity here to pursue weight managment.      Morbid obesity with BMI of 50.0-59.9, adult (HCC)  - Discussed options of HealthyCORE-Intensive Lifestyle Intervention Program, Very Low Calorie Diet-VLCD, Conservative Program, Ashvin-En-Y Gastric Bypass, and Vertical Sleeve Gastrectomy and the role of weight loss medications.  - Initial weight loss goal of 5-10% weight loss for improved health  - Patient is interested in pursuing Conservative Program, Ashvin-En-Y Gastric Bypass, and Vertical Sleeve Gastrectomy  - Screening labs: Pending in system  - Calorie goal: 7549-0351 calories per day  - Start Zepbound 2.5mg weekly  - Patient 6 months postpartum and denies breastfeeding, discussed need for birth control while on medications  - Consider Phentermine if not covered, discussed need for EKG first  - Medication agreement signed: Yes  - Follow up in 2 months with me and follow up in [place] for surgical consult with Dr. Thorpe/Ko Tracey  - Dietician: Agreeable in the future       Go to [email or web address] for further information. Instructions on how to inject yourself are located on the website.    - Zebound was sent to your pharmacy. The prior authorization process will been done through our prior authorization team and can take up to 3-4 weeks to process through the insurance.     - Start Zepbound 2.5 mg subcutaneously weekly. After you have taken the second pen, please give me an update, as we will likely increase the dose the next month if you are tolerating it well.     - Side effects of Zepbound include nausea, vomiting, diarrhea, or constipation. Keep an eye on your heart rate while on Zepbound. If you resting heart rate is greater than 100 beats per minutes, please notify me. Please eat small frequent meals to help reduce nausea. Lemon water and saltine crackers may help with this. If you experience fever, nausea/vomiting, and pain  radiating to your back this may be a sign of pancreatitis. Please have ED evaluation if this occurs.     - Please hold Zepbound for one week prior to a procedure such as surgery, upper endoscopy, or colonoscopy for risk of aspiration.     - Zepbound can reduce the effectiveness of oral hormonal birth control (birth control pills). Recommend a barrier backup method such as condoms to prevent pregnancy.      Goals/General Recommendations:     Food log (ie.) [email or web address], loseit.com, [email or web address], etc.   Practice mindful eating.  Be sure to set aside time to eat, eat slowly, and savor your food.  Do NOT drink your calories and aim for AT LEAST 64oz of water daily. No sugar sweetened beverages.  No juice (eat the fruit instead).  Eat breakfast daily.  Do not skip meals.    Exercise:   Increase physical activity by 10 minutes daily. Gradually increase physical activity to a goal of 5 days per week for 30 minutes of MODERATE intensity PLUS 2 days per week of FULL BODY resistance training. Resistance training can increase muscle mass and increase our resting metabolic rate.   FULL BODY resistance training is recommended 2-3 times a week.  Do not do this on consecutive days to allow for muscle recovery.  Aim for a bare minimum 8-10,000 steps, even on days you do not exercise.     Monitoring:   Weigh yourself daily.  If this causes undue stress, then just weigh yourself once a week.  Weigh yourself the same time of the day with the same amount of clothing on.  Preferably this should be done after waking up, before you eat, and with no clothing or minimal clothing on.    Follow up in approximately 2 months with Surgical Advanced Practitioner.    Diagnoses and all orders for this visit:    Morbid obesity with BMI of 50.0-59.9, adult (HCC)  -     tirzepatide (Zepbound) 2.5 mg/0.5 mL auto-injector; Inject 0.5 mL (2.5 mg total) under the skin once a week for 28 days    Increased BMI  -     Ambulatory  Referral to Weight Management        Food log (ie.) [email or web address],[email or web address],etc.   No sugary beverages. At least 64oz of water daily.  Goal protein intake of 60-80 grams per day  25-35 grams of dietary fiber per day  7165-7220 calories per day    - Discussed options of HealthyCORE-Intensive Lifestyle Intervention Program, Very Low Calorie Diet-VLCD, Conservative Program, Ashvin-En-Y Gastric Bypass, and Vertical Sleeve Gastrectomy and the role of weight loss medications.  - Explained the importance of making lifestyle changes first before starting anti-obesity medications.   - Patient is interested in pursuing Conservative Program   - Initial weight loss goal of 5-10% weight loss for improved health as studies have shown this is where we see the greatest impact on improving health and decreasing risk of obesity related conditions.  - Weight loss can improve patient's co-morbid conditions and/or prevent weight-related complications.  - Stop Bang 5/8  - Labs reviewed: TSH, A1C, lipid panel, CMP, CBC all pending in system        Subjective:     Patient ID: Ramez Scott  is a 29 y.o. female with excess weight/obesity here to pursue weight managment.    Past Medical History:   Diagnosis Date    Chlamydia     in the past    Varicella     had vaccine       HPI:    Severity: Morbid  Onset:  9 years    Highest weight: 292 lb  Lowest Weight: 160 lb  Current weight: 292 lb  What has been tried: Diet and Exercise  Contributing factors: Pregnancy and Insufficient time to make appropriate lifestyle changes  Associated symptoms and effects: fatigue, increased joint pain, and decreased mobility   Goal weight: 160 lb  5% weight loss = 14 lb  20% weight loss = 58 lb    Wt Readings from Last 20 Encounters:   02/20/25 132 kg (292 lb)   02/07/25 132 kg (291 lb 3.2 oz)   02/03/25 132 kg (291 lb)   07/16/24 128 kg (282 lb 8 oz)   06/13/24 133 kg (293 lb)   06/05/24 133 kg (293 lb 1.6 oz)   06/04/24  133 kg (294 lb)   05/31/24 133 kg (294 lb)   05/23/24 132 kg (291 lb 12.8 oz)   05/20/24 133 kg (293 lb)   05/15/24 132 kg (290 lb 9.6 oz)   05/06/24 132 kg (290 lb)   05/02/24 131 kg (288 lb 6.4 oz)   05/01/24 131 kg (288 lb 12.8 oz)   04/24/24 129 kg (284 lb 6.4 oz)   03/27/24 125 kg (275 lb)        Food logging:  B: Coffee + [place] vanilla creamer  S: skips  L: Chicken wrap or sandwich on honey wheat   S: skips  D: Tacos or chicken + mashed potatoes + veggies  S: Bowl of cereal or bowl of ice cream     Hunger/Cravings: Sweets  Dining out: Once a month  Hydration: 5-6 16oz bottles of water, occasional soda  Alcohol: Rare  Smoking: Denies  Exercise: Denies  Weight Monitoring:  Not that often   Sleep: 6-8 hours of sleep  STOP-BANG Score: 3/8  Occupation: Unemployed  Contraception: None, was on depo and gained weight   Colonoscopy: N/A    Patient denies personal and family history of pancreatitis, thyroid cancer, MEN-2 tumors.  Denies any hx of glaucoma, seizures, kidney stones, gallstones.  Denies Hx of CAD, PAD, palpitations, arrhythmia.   Denies uncontrolled anxiety or depression, suicidal behavior or thinking, insomnia or sleep disturbance.       The following portions of the patient's history were reviewed and updated as appropriate: allergies, current medications, past family history, past medical history, past social history, past surgical history, and problem list.    Review of Systems   Constitutional:  Negative for chills and fever.   HENT:  Negative for ear pain and sore throat.    Eyes:  Negative for pain and visual disturbance.   Respiratory:  Negative for cough and shortness of breath.    Cardiovascular:  Negative for chest pain and palpitations.   Gastrointestinal:  Negative for abdominal pain and vomiting.   Genitourinary:  Negative for dysuria and hematuria.   Musculoskeletal:  Negative for arthralgias and back pain.   Skin:  Negative for color change and rash.   Neurological:  Negative for seizures  "and syncope.   All other systems reviewed and are negative.      Objective:    /72 (BP Location: Right arm, Patient Position: Sitting, Cuff Size: Large)   Pulse 86   Temp 98.2 °F (36.8 °C) (Temporal)   Resp 20   Ht 5' 2\" (1.575 m)   Wt 132 kg (292 lb)   SpO2 97%   BMI 53.41 kg/m²     Physical Exam  Constitutional:       Appearance: Normal appearance. She is obese.   HENT:      Head: Normocephalic and atraumatic.      Nose: Nose normal.      Mouth/Throat:      Mouth: Mucous membranes are moist.   Eyes:      Extraocular Movements: Extraocular movements intact.      Pupils: Pupils are equal, round, and reactive to light.   Cardiovascular:      Rate and Rhythm: Normal rate and regular rhythm.      Pulses: Normal pulses.      Heart sounds: Normal heart sounds.   Pulmonary:      Effort: Pulmonary effort is normal.      Breath sounds: Normal breath sounds.   Abdominal:      Palpations: Abdomen is soft.   Musculoskeletal:      Cervical back: Normal range of motion.   Skin:     General: Skin is warm.   Neurological:      General: No focal deficit present.      Mental Status: She is alert and oriented to person, place, and time.   Psychiatric:         Mood and Affect: Mood normal.         Behavior: Behavior normal.           Cailin Bell PA-C  Bariatric Surgery    "

## 2025-02-21 ENCOUNTER — OFFICE VISIT (OUTPATIENT)
Dept: INTERNAL MEDICINE CLINIC | Facility: CLINIC | Age: 30
End: 2025-02-21
Payer: COMMERCIAL

## 2025-02-21 VITALS
HEIGHT: 62 IN | OXYGEN SATURATION: 98 % | TEMPERATURE: 97 F | BODY MASS INDEX: 53.18 KG/M2 | SYSTOLIC BLOOD PRESSURE: 116 MMHG | HEART RATE: 80 BPM | DIASTOLIC BLOOD PRESSURE: 76 MMHG | WEIGHT: 289 LBS

## 2025-02-21 DIAGNOSIS — Z12.4 SCREENING FOR CERVICAL CANCER: ICD-10-CM

## 2025-02-21 DIAGNOSIS — Z12.2 ENCOUNTER FOR SCREENING FOR MALIGNANT NEOPLASM OF LUNG: ICD-10-CM

## 2025-02-21 DIAGNOSIS — Z87.891 FORMER SMOKER: ICD-10-CM

## 2025-02-21 DIAGNOSIS — E66.01 MORBID OBESITY WITH BMI OF 50.0-59.9, ADULT (HCC): ICD-10-CM

## 2025-02-21 DIAGNOSIS — R06.02 SOB (SHORTNESS OF BREATH): Primary | ICD-10-CM

## 2025-02-21 DIAGNOSIS — Z23 ENCOUNTER FOR IMMUNIZATION: ICD-10-CM

## 2025-02-21 DIAGNOSIS — R73.03 PREDIABETES: ICD-10-CM

## 2025-02-21 DIAGNOSIS — R07.89 ATYPICAL CHEST PAIN: ICD-10-CM

## 2025-02-21 LAB — HCV AB SER QL: NORMAL

## 2025-02-21 PROCEDURE — 99214 OFFICE O/P EST MOD 30 MIN: CPT | Performed by: INTERNAL MEDICINE

## 2025-02-21 PROCEDURE — 93000 ELECTROCARDIOGRAM COMPLETE: CPT | Performed by: INTERNAL MEDICINE

## 2025-02-21 RX ORDER — ALPRAZOLAM 0.5 MG
0.5 TABLET ORAL 3 TIMES DAILY PRN
Qty: 30 TABLET | Refills: 0 | Status: SHIPPED | OUTPATIENT
Start: 2025-02-21

## 2025-02-21 NOTE — PROGRESS NOTES
Name: Ramez Scott      : 1995      MRN: [id number]  Encounter Provider: Grant Salinas DO  Encounter Date: 2025   Encounter department: [facility]  :  Assessment & Plan  Screening for cervical cancer    Orders:    Ambulatory referral to Obstetrics / Gynecology; Future    Encounter for immunization         Encounter for screening for malignant neoplasm of lung  I discussed with her that she is a candidate for lung cancer CT screening.     The following Shared Decision-Making points were covered:  Benefits of screening were discussed, including the rates of reduction in death from lung cancer and other causes.  Harms of screening were reviewed, including false positive tests, radiation exposure levels, risks of invasive procedures, risks of complications of screening, and risk of overdiagnosis.  I counseled on the importance of adherence to annual lung cancer LDCT screening, impact of co-morbidities, and ability or willingness to undergo diagnosis and treatment.  I counseled on the importance of maintaining abstinence as a former smoker or was counseled on the importance of smoking cessation if a current smoker    Review of Eligibility Criteria: She meets all of the criteria for Lung Cancer Screening.   She is 29 y.o.   She has 20 pack year tobacco history and is a current smoker or has quit within the past 15 years  She presents no signs or symptoms of lung cancer    After discussion, the patient decided to elect lung cancer screening.         SOB (shortness of breath)    Orders:    Complete PFT; Future    POCT ECG    ALPRAZolam (XANAX) 0.5 mg tablet; Take 1 tablet (0.5 mg total) by mouth 3 (three) times a day as needed for anxiety    Former smoker    Orders:    Complete PFT; Future    Morbid obesity with BMI of 50.0-59.9, adult (HCC)           Atypical chest pain    Orders:    POCT ECG    ALPRAZolam (XANAX) 0.5 mg tablet; Take 1 tablet (0.5 mg total) by mouth 3 (three) times a  "day as needed for anxiety    Prediabetes       A/P: Stable and discussed labs and CXR. Doubt DVT/PE now. EKG w/o acute changes and cardiac less likely. Suspect restrictive lung disease and ?TERESSA. DDX still includes asthma. Will check PFT's. ?panic attacks, but denies PERCY. Will give PRN benzo to try. If PERCY, start SSRI. Appreciate bariatric input. May need a sleep study or see pulmonary. Will give info on modifying pre diabetes. RTC one month for f/u.          History of Present Illness   WF RTC for a several week f/u due to SOB and chest discomfort. ?cause and felt to possibly be due to deconditioning, wt, ?TERESSA, and ?restrictive lung disease. CXR was normal. D-dimer done due to SOB, CP, and H/o miscarriages and was negative. Labs otherwise normal except for prediabetic range HgA1c. Reports SOB and CP, but none since last visit. Recap of lower sternal pain and epigastric pain the goes around both flanks and pt SOB. No known triggers. No wheezing. Denies any PERCY or h/o panic attacks.  Seen by wt loss meds by bariatrics.       Review of Systems   Constitutional:  Negative for activity change, chills, diaphoresis, fatigue and fever.   HENT: Negative.     Eyes:  Negative for visual disturbance.   Respiratory:  Positive for shortness of breath. Negative for cough, chest tightness and wheezing.    Cardiovascular:  Negative for chest pain, palpitations and leg swelling.   Gastrointestinal:  Negative for abdominal pain, constipation, diarrhea, nausea and vomiting.   Genitourinary:  Negative for difficulty urinating, dysuria and frequency.   Musculoskeletal:  Negative for arthralgias, gait problem and myalgias.   Neurological:  Negative for light-headedness and headaches.   Psychiatric/Behavioral:  Negative for confusion. The patient is not nervous/anxious.        Objective   /76 (Patient Position: Sitting, Cuff Size: Large)   Pulse 80   Temp (!) 97 °F (36.1 °C) (Tympanic)   Ht 5' 2\" (1.575 m)   Wt 131 kg (289 lb)   " LMP 02/13/2025 (Exact Date)   SpO2 98%   Breastfeeding No   BMI 52.86 kg/m²      Physical Exam  Vitals and nursing note reviewed.   Constitutional:       General: She is not in acute distress.     Appearance: Normal appearance. She is obese. She is not ill-appearing.   HENT:      Head: Normocephalic and atraumatic.      Mouth/Throat:      Mouth: Mucous membranes are moist.   Eyes:      Extraocular Movements: Extraocular movements intact.      Conjunctiva/sclera: Conjunctivae normal.      Pupils: Pupils are equal, round, and reactive to light.   Cardiovascular:      Rate and Rhythm: Normal rate and regular rhythm.      Heart sounds: Normal heart sounds. No murmur heard.  Pulmonary:      Effort: Pulmonary effort is normal. No respiratory distress.      Breath sounds: Normal breath sounds. No wheezing, rhonchi or rales.   Abdominal:      General: Bowel sounds are normal. There is no distension.      Palpations: Abdomen is soft.      Tenderness: There is no abdominal tenderness.   Neurological:      General: No focal deficit present.      Mental Status: She is alert and oriented to person, place, and time. Mental status is at baseline.   Psychiatric:         Mood and Affect: Mood normal.         Behavior: Behavior normal.         Thought Content: Thought content normal.         Judgment: Judgment normal.         Scheduled Medication Review:  Pt's scheduled medication use was reviewed by myself/staff via the PDMP website. Pt's use has been found to be appropriate w/o any concerns for misuse by the patient. Pt's current conditions require continued scheduled medication use at this time. Future review for continued appropriate medication use and misuse will continue.

## 2025-02-21 NOTE — PATIENT INSTRUCTIONS
"Patient Education     Lowering your risk of prediabetes and type 2 diabetes   The Basics   Written by the doctors and editors at [facility]   What is the difference between prediabetes and diabetes? -- Diabetes is a disorder that disrupts the way the body uses sugar.  All of the cells in the body need sugar to work normally. Sugar gets into the cells with the help of a hormone called insulin. Insulin is made by the pancreas, an organ in the belly. When a person's body stops responding to insulin normally, blood sugar can rise over time. If the blood sugar rises high enough, type 2 diabetes develops. This can lead to other problems.  \"Prediabetes\" is a term doctors use if a person's blood sugar is higher than normal, but not high enough to be called diabetes. People with prediabetes are at high risk of getting type 2 diabetes over time.  What increases my risk for prediabetes and diabetes? -- There are a few things that can increase your risk, including:   Having excess body weight or obesity, especially if you carry extra weight in your belly area   Not doing enough physical activity   Smoking   Having a close relative with diabetes   Having diabetes during pregnancy, called \"gestational diabetes\"  Are there ways to lower my risk? -- Yes. To lower your chances of getting prediabetes or diabetes, the most important things you can do are to eat a healthy diet and get plenty of physical activity. These can help you lose weight if you have excess body weight. But eating well and being active are also good for your overall health, whether or not they lead to weight loss. Even gentle activity, like walking, has benefits.  If you smoke, quitting can also lower your risk. This can be difficult, but your doctor or nurse can help. Quitting smoking also lowers your risk of stroke, heart disease, and lots of other problems.  How do I know if I have prediabetes? -- There are 3 different tests that can show if your blood sugar is " "higher than normal. They measure blood sugar in different ways.  The tests are:   Fasting glucose test - \"Glucose\" is the medical term for sugar. This test measures your blood sugar when you have not had anything to eat or drink (except water) for at least 8 hours. People with prediabetes have a fasting glucose between 100 and 125 (table 1).   Glucose tolerance test - For this test, you do not eat or drink anything for 8 to 12 hours. But then, as part of the test, you have a sugary drink. Two hours later, a doctor or nurse takes a blood sample to see how high your blood sugar xochitl. People with prediabetes have blood sugar levels between 140 and 199 during this test (table 1).   Hemoglobin A1C test (or just \"A1C\") - This test can be done at any time, even if you have recently eaten. It is a blood test that shows what your average blood sugar level has been for the past 2 to 3 months. People with prediabetes have A1C levels between 5.7 and 6.4.  What should I do if I have prediabetes? -- If you have prediabetes, you can make lifestyle changes to lower the chance that you will get diabetes. You can:   Eat a healthy diet - Try to eat a diet with lots of fruits, vegetables, and low-fat dairy products, but low in meats, sweets, and refined grains. If you don't have fresh fruits and vegetables available, you can eat frozen ones instead. Try to avoid sweet drinks, like soda and juice.  If you are above your goal body weight, trying to get to a healthy body weight can help. Your doctor or nurse can help you find healthy ways to do this.  Losing 5 to 10 percent of your body weight can lower your risk a lot. For example:   If you weigh 200 pounds (91 kg), this means losing 10 to 20 pounds (4.5 to 9 kg).   If you weigh 150 pounds (68 kg), this means losing 7 to 15 pounds (3 to 7 kg).   Be active for 30 minutes a day - You don't have to go to the gym or do heavy exercise to get a benefit. Activities like walking, gardening, and " dancing can all help improve your health.   Quit smoking - If you smoke, ask your doctor or nurse for advice on how to quit. People are much more likely to succeed if they have help and get medicines to help them quit.  Can medicines help lower my risk of diabetes? -- Sometimes. Usually, doctors recommend trying lifestyle changes first. But if these changes do not help enough, your doctor might prescribe medicines. If so, follow all instructions for taking them.  Depending on your situation, you might get medicines to:   Help lower your blood sugar   Help you lose weight, if you have excess body weight or obesity   Lower your blood pressure or cholesterol - Prediabetes also increases your risk of heart attacks, strokes, and other problems, so these medicines are important.  All topics are updated as new evidence becomes available and our peer review process is complete.  This topic retrieved from [employer] on: Feb 26, 2024.  Topic 05870 Version 13.0  Release: 32.2.4 - C32.56  © 2024 UpToDate, Inc. and/or its affiliates. All rights reserved.  table 1: Diabetes screening tests     Glucose level    Fasting glucose    Normal 70 to 99   Pre-diabetes 100 to 125   Diabetes 126 or higher on at least 2 tests   Glucose tolerance    Normal Below 140   Pre-diabetes 140 to 199   Diabetes 200 or higher on at least 2 tests      A1C percent    A1C    Normal Below 5.7   Pre-diabetes 5.7 to 6.4   Diabetes 6.5 or higher on at least 2 tests   Pre-diabetes is a term doctor or nurses use as a warning. People with pre-diabetes do not yet have diabetes, but they are at increased risk of getting it.  Graphic 14947 Version 2.0  Consumer Information Use and Disclaimer   Disclaimer: This generalized information is a limited summary of diagnosis, treatment, and/or medication information. It is not meant to be comprehensive and should be used as a tool to help the user understand and/or assess potential diagnostic and treatment options. It does  NOT include all information about conditions, treatments, medications, side effects, or risks that may apply to a specific patient. It is not intended to be medical advice or a substitute for the medical advice, diagnosis, or treatment of a health care provider based on the health care provider's examination and assessment of a patient's specific and unique circumstances. Patients must speak with a health care provider for complete information about their health, medical questions, and treatment options, including any risks or benefits regarding use of medications. This information does not endorse any treatments or medications as safe, effective, or approved for treating a specific patient. UpToDate, Inc. and its affiliates disclaim any warranty or liability relating to this information or the use thereof.The use of this information is governed by the Terms of Use, available at https://www.woltersWOMNuwer.com/en/know/clinical-effectiveness-terms. 2024© UpToDate, Inc. and its affiliates and/or licensors. All rights reserved.  Copyright   © 2024 UpToDate, Inc. and/or its affiliates. All rights reserved.

## 2025-02-26 ENCOUNTER — OFFICE VISIT (OUTPATIENT)
Dept: BARIATRICS | Facility: CLINIC | Age: 30
End: 2025-02-26
Payer: COMMERCIAL

## 2025-02-26 VITALS
SYSTOLIC BLOOD PRESSURE: 118 MMHG | HEIGHT: 64 IN | TEMPERATURE: 97.5 F | WEIGHT: 287.5 LBS | BODY MASS INDEX: 49.08 KG/M2 | DIASTOLIC BLOOD PRESSURE: 84 MMHG | HEART RATE: 78 BPM

## 2025-02-26 DIAGNOSIS — E66.01 MORBID OBESITY WITH BMI OF 50.0-59.9, ADULT (HCC): Primary | ICD-10-CM

## 2025-02-26 PROCEDURE — 99204 OFFICE O/P NEW MOD 45 MIN: CPT | Performed by: SURGERY

## 2025-02-26 NOTE — PROGRESS NOTES
"    BARIATRIC INITIAL CONSULT - BARIATRIC SURGERY    Ramez Scott 29 y.o. female MRN: [id number]  Unit/Bed#:  Encounter: [id number]      HPI:  Ramez Scott is a 29 y.o. female who presents with a longstanding history of morbid obesity and inability to sustain a meaningful weight loss.    Here today to discuss bariatric options.    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      Historical Information   Past Medical History:   Diagnosis Date    Chlamydia     in the past    Varicella     had vaccine     Past Surgical History:   Procedure Laterality Date    FRACTURE SURGERY  2015    right hand     Social History   Social History     Substance and Sexual Activity   Alcohol Use Not Currently    Comment: Social drink     Social History     Substance and Sexual Activity   Drug Use Never     Social History     Tobacco Use   Smoking Status Former    Current packs/day: 0.25    Average packs/day: 0.3 packs/day for 5.2 years (1.3 ttl pk-yrs)    Types: Cigarettes   Smokeless Tobacco Never   Tobacco Comments    Quit 2024     Family History: Family history non-contributory    Meds/Allergies   all medications and allergies reviewed  No Known Allergies    Objective       Current Vitals:   Blood Pressure: 118/84 (02/26/25 1134)  Pulse: 78 (02/26/25 1134)  Temperature: 97.5 °F (36.4 °C) (02/26/25 1134)  Temp Source: Tympanic (02/26/25 1134)  Height: 5' 3.5\" (161.3 cm) (02/26/25 1134)  Weight - Scale: 130 kg (287 lb 8 oz) (02/26/25 1134)        Invasive Devices       None                   Physical Exam  Vitals " reviewed.   Constitutional:       General: She is not in acute distress.     Appearance: She is well-developed. She is not diaphoretic.   HENT:      Head: Normocephalic and atraumatic.      Right Ear: External ear normal.      Left Ear: External ear normal.      Nose: Nose normal.   Eyes:      General: No scleral icterus.        Right eye: No discharge.         Left eye: No discharge.      Conjunctiva/sclera: Conjunctivae normal.   Neurological:      Mental Status: She is alert and oriented to person, place, and time.   Psychiatric:         Behavior: Behavior normal.         Thought Content: Thought content normal.         Judgment: Judgment normal.         Lab Results: I have personally reviewed pertinent lab results.    Imaging: Results Review Statement: No pertinent imaging studies reviewed.  EKG, Pathology, and Other Studies: Results Review Statement: No pertinent imaging studies reviewed.      Assessment/PLAN:    29 y.o. female with a long standing h/o of obesity and inability to sustain any meaningful weight loss on her own despite several attempts.    She is interested in the Laparoscopic Gastric Bypass.  I have discussed both options with her today.    As a part of her pre op evaluation, she will be referred to a cardiologist for risk stratification and for a sleep evaluation and consult to rule out obstructive sleep apnea if deemed necessary based on her score.    She needs an EGD to evaluate the anatomy of her GI tract prior to the operation.  I have spent over 30 minutes with her face to face in the office today discussing her options and details of the surgery. We have seen an animation of the surgery on the computer that illustrates how the operation is done and how the anatomy will be altered with the procedure. Over 50% of this was coordinating care.    I have used the MBSAQIP bariatric surgical risk/benefit calculator and we have discussed the results as part of the preop education.  She was given  the opportunity to ask questions and I have answered all of them.  I have discussed and educated the patient with regards to the components of our multidisciplinary program and the importance of compliance and follow up in the post operative period. The patient was also instructed with regards to the importance of behavior modification, nutritional counseling, support meeting attendance and lifestyle changes that are important to ensure success.     Although there is a great statistical chance of improvement or even resolution of most of her associated comorbidities, the results vary from patient to patient and they largely depend on her commitment and compliance.     I have reviewed instructions for stopping or tapering anti-obesity medications prior to surgery.      I have encouraged her to lose 15 lbs prior to the operation.      Fahad Thorpe MD  2/26/2025  11:39 AM

## 2025-02-26 NOTE — PROGRESS NOTES
- Height/Weight:  63.5 in, 287.5 lb  - BMI:   50.1  - Medical conditions related to obesity: non  - Does the patient smoke/vape (nicotine, marijuana, hookah, etc): no  - StopBAN/8   - Does the patient currently take a weight loss medication: no , Pt has been approved for  Zepbound 2.5 mg not taken yet

## 2025-03-11 ENCOUNTER — CLINICAL SUPPORT (OUTPATIENT)
Dept: BARIATRICS | Facility: CLINIC | Age: 30
End: 2025-03-11

## 2025-03-11 DIAGNOSIS — E66.01 MORBID OBESITY (HCC): Primary | ICD-10-CM

## 2025-03-11 PROCEDURE — RECHECK

## 2025-03-11 NOTE — PROGRESS NOTES
Spoke to patient on the phone:    Patient is interested in the bariatric surgical process. Patient qualifies for surgery with current comorbidities and BMI. Discussed the entire surgical workup process and all requirements of [facility] program and patient's insurance. Answered all questions at the time of the phone call. All SW/RD questions redirected to the next appointment, the 2-hour evaluation.    Patient has been informed to contact insurance to verify there is Bariatric surgery  coverage written on the policy prior next appointment. Also to discuss of any out of pocket expense if insurance does not cover at 100%      Patient verbalized understanding of the surgical process at [facility] and had no further questions at this time.

## 2025-03-13 DIAGNOSIS — E66.01 MORBID OBESITY WITH BMI OF 50.0-59.9, ADULT (HCC): Primary | ICD-10-CM

## 2025-03-13 RX ORDER — TIRZEPATIDE 5 MG/.5ML
5 INJECTION, SOLUTION SUBCUTANEOUS WEEKLY
Qty: 2 ML | Refills: 0 | Status: SHIPPED | OUTPATIENT
Start: 2025-03-13 | End: 2025-04-10

## 2025-03-14 NOTE — PROGRESS NOTES
Bariatric Behavioral Health / Psychological Evaluation    ZEPBOUND 2.5 mg soon to increase.     /  weight check    Surgeon:  Dr. Thorpe    Starting weight:  287.5 #  Today's weight: 281 #    Presenting Problem:   .Ramez Scott  is a 29 y.o.   female    :  1995   Patient presented with overall concerns of obesity.  Stated that weight has impacted quality of life and has current future concerns with lack of mobility, movement, chronic pain, and overall health.  Has attempted various weight loss plans in the past including healthy choices and movement.    Patient is Interested in exploring bariatric surgery.as an option for  weight loss goals.        Is the patient seeking Bariatric Surgery?   YES  Family and friends post surgery.    Current Barriers to Change: none noted     Realizes Post- Op Requirements? Yes, and will learn more meeting with the dietitians and social workers through the process     Pre-morbid level of function and history of present illness: SOB, knee pain, lower back pain.    Stressors and Supports: Joseph and her mother are supportive.  Moved to [place] from [place] after Krzysztof's mom passed and they lost the home they were living in     Living situation: 5 children. Ages 10, 8, 5, 3, 10 mos   Joseph   ( Stevee cooks for her family)  Lives with her mom   Two older children in school.   5 yr old in .    Work: not working -  Starting to look for a job.  May start school for medical field     Physical Activity: walking 4x a week. Tracking her steps and goal is 10K     Family History (medical, traditions, culture, rules/routines around food):          Mental Health, Trauma and Substance use Assessment    Psychiatric/Psychological Treatment Diagnosis:   Nothing noted in chart.  Coping skills:  walk away, breath, talk it out.    History of Eating Disorder:  none noted     Outpatient Counselor No      Psychiatrist  - No     Have you had any Mental Health Higher level of care (ED, PHP or  Inpatient ) Treatment? No      Drug and/or Alcohol use and treatment history: none    Have you had any Substance Use Treatment? No     Tobacco/Vaping History:   former  Patient agrees to remain nicotine free post surgery.        Risk Assessment    Identified support system intact.     Risk of harm to self or others:  none noted during evaluation  No HI/SI      Presence of Audio/Visual Hallucinations: Not reported during evaluation     Access to weapons: Not reported during evaluation     Observation: this interview only (SW and RD will follow Ramez Scott through the bariatric program)     Based on the previous information, the client presents the following risk of harm to self or others:  Low risk        Physical/Mental Health Status:              Appearance: appropriate           Sociability: average           Affect: appropriate           Mood: calm           Thought Process: coherent           Speech: normal           Content: no impairment           Orientation: person  Yes , place  Yes , time  Yes , normal attention span  Yes , normal memory  Yes   and normal judgement  Yes            Insight: emotional  good       BARIATRIC SURGERY EDUCATION CHECKLIST    Patient has received the following education related to the bariatric surgery process and understands:    Patients may be required to complete a psychiatric evaluation and receive clearance for surgery from mental health provider.    Patients who undergo weight loss surgery are at higher risk of increased mental health concerns and suicide attempts.    Patients may be required to complete a full substance abuse evaluation and then complete all treatment recommendations prior to surgery.    If diagnosis of abuse/dependence results, patient may be required to remain sober for one (1) year before having bariatric surgery.    Patients on psychiatric medications should check with their provider to discuss psychiatric medications and the changes in absorption.   Patient should discuss all time release medications with provider and take all medications as prescribed.    The recommendation is that there is no use of any tobacco products, Hookah or vapes for the bariatric post-operation patient.    Bariatric surgery patients should not consume alcohol as a post-operative patient as it may increase risk of numerous health conditions including but not limited to alcohol abuse and ulcers.    There is a possibility of weight regain if patient does not follow all program guidelines and recommendations.    Bariatric surgery patients should exercise thirty (30) to sixty (60) minutes per day to maintain post-surgical weight loss.    Research indicates that bariatric patients are more successful when they see a therapist for up to two (2) years post-op.    Patients will follow all medical and dietary recommendations provided.    Patient will keep all scheduled appointments and follow up with their physician for a minimum of five (5) years.    Patient will take all vitamins as recommended.  Post-operative vitamins are life-long.    There is a goal month set.  All requirements should be met by this time. Don't wait to get started!    There is a deadline month set.  All requirements must be finished by this time and if not, the patient will be halted in the surgery process. The patient can be referred to the medical weight management program or can come back to the surgical program once the unfinished tasks from the previous program are completed.      Female patients of childbearing years are informed that pregnancy is not recommended until 12 - 18 months post-op.      Recommendations: Recommended for surgery  yes    Social Service Note:  Patient presented for behavioral health evaluation for the bariatric program.   Negative for Mental Health.    Denied history of therapy.   Denied history of Psychiatry.   No reported history of  Drug and/or Alcohol abuse or treatment.  Former tobacco  use.  Patient educated regarding the impact of nicotine and alcohol on the post surgery bariatric patient. Patient has a negative family history of tobacco and alcohol addiction.     Patient has not reported contraindications for bariatric surgery.  Patient will begin making changes with relationship with food through behavior modifications and mindful techniques.  Tracking food intake for one week every three months can assist with weight maintenance and self accountability for post surgery success.   and Dietitian follow up visits are available for pre and post surgery support.  Bariatric support group is available monthly.   Patient verbalized the ability to start making changes and create a healthy relationship around nutrition.     Patient meets criteria for surgery at this time and is referred back to the bariatric surgeon.        Jude Izquierdo LCSW

## 2025-03-17 ENCOUNTER — TELEPHONE (OUTPATIENT)
Dept: BARIATRICS | Facility: CLINIC | Age: 30
End: 2025-03-17

## 2025-03-18 ENCOUNTER — PREP FOR PROCEDURE (OUTPATIENT)
Dept: BARIATRICS | Facility: CLINIC | Age: 30
End: 2025-03-18

## 2025-03-18 ENCOUNTER — CLINICAL SUPPORT (OUTPATIENT)
Dept: BARIATRICS | Facility: CLINIC | Age: 30
End: 2025-03-18

## 2025-03-18 VITALS — WEIGHT: 281 LBS | HEIGHT: 63 IN | BODY MASS INDEX: 49.79 KG/M2

## 2025-03-18 DIAGNOSIS — E66.01 MORBID OBESITY WITH BMI OF 50.0-59.9, ADULT (HCC): Primary | ICD-10-CM

## 2025-03-18 DIAGNOSIS — Z98.84 BARIATRIC SURGERY STATUS: Primary | ICD-10-CM

## 2025-03-18 PROCEDURE — RECHECK: Performed by: DIETITIAN, REGISTERED

## 2025-03-18 PROCEDURE — RECHECK

## 2025-03-18 NOTE — PROGRESS NOTES
IUD Procedure    Date/Time: 3/20/2025 1:25 PM    Performed by: Jayden Ledbetter MD  Authorized by: Jayden Ledbetter MD    Other Assisting Provider: No    Verbal consent obtained?: Yes    Risks and benefits: Risks, benefits and alternatives were discussed    Consent given by:  Patient  Time Out:     Time out: Immediately prior to the procedure a time out was called    Patient states understanding of procedure being performed: Yes    Patient's understanding of procedure matches consent: Yes    Procedure consent matches procedure scheduled: Yes    Relevant documents present and verified: Yes    Required items: Required blood products, implants, devices and special equipment available    Select procedure: IUD insertion    IUD Insertion:     Pelvic exam performed: yes      Negative urine pregnancy test: yes      Negative serum pregnancy test: no      Cervix cleaned and prepped: yes      Speculum placed in vagina: yes      Tenaculum applied to cervix: yes      Allis applied to cervix: no      IUD inserted with no complications: yes      Strings trimmed: yes      Uterus sound depth (cm):  9    IUD type:  1 each intrauterine copper  Post-procedure:     Patient tolerated procedure well: yes      Patient will follow up after next period: yes

## 2025-03-18 NOTE — PROGRESS NOTES
Bariatric Nutrition Assessment Note    Type of surgery    Preop- sleeve   Surgery Date: Expected date: Oct/Nov   6 month program requirement   Surgeon: [facility] Surgeons: Dr. Thorpe    Saw [facility] provider and was started on zepbound     Nutrition Assessment   Ramez ADAN Tyler  29 y.o.  female     Wt with BMI of 25: 135.9 lbs   LMP 02/13/2025 (Exact Date)   Pt has lost 11# since starting zepbound     Wt Readings from Last 3 Encounters:   02/26/25 130 kg (287 lb 8 oz)   02/21/25 131 kg (289 lb)   02/20/25 132 kg (292 lb)        Tere Schumacher Equation:    [id number]  Weight Maintenance 2379  Estimated calories for weight loss 7300-1120 ( 1-2# per wk wt loss - sedentary )  Estimated protein needs 62-93 grams per day (1.0-1.5 gms/kg IBW )   Fluids:  125.39  oz total ( Love-Segar method )   Fluid Requirement Calculator  Free Fluid  100  oz free fluid (Webster-Segar method; -20%)    Weight History   Onset of Obesity: Adult- after birth of first daughter   Family history of obesity: No  Wt Loss Attempts: Counseling with  [place]  Exercise  Self Created Diets (Portion Control, Healthy Food Choices, etc.)  Medication   Patient has tried the above for 6 months or more with insufficient weight loss or weight regain, which is why patient has requested to be evaluated for weight loss surgery today  Maximum Wt Lost: 20# - diet and exercise      Review of History and Medications   Past Medical History:   Diagnosis Date    Chlamydia     in the past    Varicella     had vaccine     Past Surgical History:   Procedure Laterality Date    FRACTURE SURGERY  2015    right hand     Social History     Socioeconomic History    Marital status: Single     Spouse name: Not on file    Number of children: Not on file    Years of education: Not on file    Highest education level: Not on file   Occupational History    Not on file   Tobacco Use    Smoking status: Former     Current packs/day: 0.25     Average packs/day: 0.3 packs/day for 5.2 years (1.3  ttl pk-yrs)     Types: Cigarettes    Smokeless tobacco: Never    Tobacco comments:     Quit 2024   Vaping Use    Vaping status: Never Used   Substance and Sexual Activity    Alcohol use: Not Currently     Comment: Social drink    Drug use: Never    Sexual activity: Yes     Partners: Male     Birth control/protection: None   Other Topics Concern    Not on file   Social History Narrative    Single    Five children    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     Social Drivers of Health     Financial Resource Strain: Not on file   Food Insecurity: No Food Insecurity (6/14/2024)    Nursing - Inadequate Food Risk Classification     Worried About Running Out of Food in the Last Year: Never true     Ran Out of Food in the Last Year: Never true     Ran Out of Food in the Last Year: Not on file   Transportation Needs: No Transportation Needs (6/14/2024)    PRAPARE - Transportation     Lack of Transportation (Medical): No     Lack of Transportation (Non-Medical): No   Physical Activity: Not on file   Stress: Not on file   Social Connections: Not on file   Intimate Partner Violence: Not on file   Housing Stability: Low Risk  (6/14/2024)    Housing Stability Vital Sign     Unable to Pay for Housing in the Last Year: No     Number of Times Moved in the Last Year: 1     Homeless in the Last Year: No   Recent Concern: Housing Stability - High Risk (5/1/2024)    Housing Stability Vital Sign     Unable to Pay for Housing in the Last Year: No     Number of Times Moved in the Last Year: 2     Homeless in the Last Year: No       Current Outpatient Medications:     ALPRAZolam (XANAX) 0.5 mg tablet, Take 1 tablet (0.5 mg total) by mouth 3 (three) times a day as needed for anxiety (Patient not taking: Reported on 2/26/2025), Disp: 30 tablet, Rfl: 0    tirzepatide (Zepbound) 5 mg/0.5 mL auto-injector, Inject 0.5 mL (5 mg total) under the skin once a week for 28 days, Disp: 2 mL, Rfl: 0    Take biotin and  "collagen for hair health     Food Intake and Lifestyle Assessment   Food Intake Assessment completed via usual diet recall  Breakfast: 7 am drinks water   Fair life shake   Snack: 0   Lunch: 11 to 12 noon - left overs for night before   Chicken and vegetable and potatoes   Snack: hard boiled egg or quest protein   Dinner: 6 to 7 pm lean protein vegetable and starch   Lots of chicken , uses ground turkey instead of ground beef   Snack: no  Beverage intake: water  Protein supplement: one per day Fairlife   Estimated protein intake per day: 60-70 grams   Estimated fluid intake per day: >80 oz or more per day   Meals eaten away from home: rarely   Typical meal pattern: 3 meals per day and 1 snacks per day  Eating Behaviors: Craves sweet foods- resolved with mediation   Food allergies or intolerances: No Known Allergies  Cultural or Amish considerations: N/A    Physical Assessment  Physical Activity  Types of exercise: Walking 4-5 walks days per week for 30 minutes with her children   Aiming for 10 000 steps per day - averaging 5000 steps per day   Current physical limitations: knee were hurting but getting better with weight loss     Psychosocial Assessment   Support systems: spouse and mother   Socioeconomic factors: full time mother with 5 children   Lives with fiancee     Nutrition Diagnosis  Diagnosis: Overweight / Obesity (NC-3.3)  Related to: Physical inactivity and Excessive energy intake  As Evidenced by: BMI >25 and Unintentional weight gain     Nutrition Prescription: Recommend the following diet  1500 calories/ 95 grams protein (25% of calories )  100 oz of calorie free beverages per day     Interventions and Teaching   Discussed pre-op and post-op nutrition guidelines.       Patient educated and handouts provided.  Surgical changes to stomach / GI  Capacity of post-surgery stomach  Diet progression  Adequate hydration  Sugar and fat restriction to decrease \"dumping syndrome\"  Fat restriction to decrease " steatorrhea  Expected weight loss  Weight loss plateaus/ possibility of weight regain  Exercise  Suggestions for pre-op diet  Nutrition considerations after surgery  Protein supplements  Meal planning and preparation  Appropriate carbohydrate, protein, and fat intake, and food/fluid choices to maximize safe weight loss, nutrient intake, and tolerance   Dietary and lifestyle changes  Possible problems with poor eating habits  Intuitive eating  Techniques for self monitoring and keeping daily food journal  Potential for food intolerance after surgery, and ways to deal with them including: lactose intolerance, nausea, reflux, vomiting, diarrhea, food intolerance, appetite changes, gas  Vitamin / Mineral supplementation of Multivitamin with minerals, Calcium, Vitamin B12, Iron, Fat Soluble vitamins, and Vitamin D  Post-operative pregnancy guidelines- Pt is having IUD placed   Patient is not currently pregnant and doesn't desire to become pregnant a minimum of one year post-op    Education provided to: patient    Barriers to learning: No barriers identified    Readiness to change: action    Prior research on procedure: discussed with provider    Comprehension: demonstrated understanding and verbalizes understanding     Expected Compliance: good    Recommendations  Pt is an appropriate candidate for surgery. Yes    Evaluation / Monitoring  Dietitian to Monitor: Eating pattern as discussed Tolerance of nutrition prescription Body weight Lab values Physical activity Bowel pattern    Goals  Food journal, Exercise 30 minutes 5 times per week, Complete lession plans 1-6, and Eat 3 meals per day  Food log 1500 calories/ 95 grams protein   Recommend and adult multivitamin and mineral and 2000 IU of D3 per day in preparation for surgery   100 oz of calories free beverages per day     Time Spent:   1 Hour

## 2025-03-20 ENCOUNTER — PROCEDURE VISIT (OUTPATIENT)
Dept: OBGYN CLINIC | Facility: CLINIC | Age: 30
End: 2025-03-20
Payer: COMMERCIAL

## 2025-03-20 VITALS
DIASTOLIC BLOOD PRESSURE: 62 MMHG | WEIGHT: 281.6 LBS | SYSTOLIC BLOOD PRESSURE: 104 MMHG | HEIGHT: 63 IN | BODY MASS INDEX: 49.89 KG/M2

## 2025-03-20 DIAGNOSIS — Z32.02 NEGATIVE PREGNANCY TEST: ICD-10-CM

## 2025-03-20 DIAGNOSIS — Z30.430 ENCOUNTER FOR IUD INSERTION: Primary | ICD-10-CM

## 2025-03-20 LAB — SL AMB POCT URINE HCG: NEGATIVE

## 2025-03-20 PROCEDURE — 58300 INSERT INTRAUTERINE DEVICE: CPT | Performed by: OBSTETRICS & GYNECOLOGY

## 2025-03-20 PROCEDURE — 81025 URINE PREGNANCY TEST: CPT | Performed by: OBSTETRICS & GYNECOLOGY

## 2025-03-20 RX ORDER — COPPER 313.4 MG/1
1 INTRAUTERINE DEVICE INTRAUTERINE
Status: COMPLETED | OUTPATIENT
Start: 2025-03-20 | End: 2025-03-20

## 2025-03-20 RX ADMIN — COPPER 1 EACH: 313.4 INTRAUTERINE DEVICE INTRAUTERINE at 13:25

## 2025-03-27 ENCOUNTER — OFFICE VISIT (OUTPATIENT)
Dept: INTERNAL MEDICINE CLINIC | Facility: CLINIC | Age: 30
End: 2025-03-27
Payer: COMMERCIAL

## 2025-03-27 VITALS
BODY MASS INDEX: 49.43 KG/M2 | TEMPERATURE: 97 F | HEIGHT: 63 IN | OXYGEN SATURATION: 99 % | HEART RATE: 71 BPM | WEIGHT: 279 LBS | SYSTOLIC BLOOD PRESSURE: 124 MMHG | DIASTOLIC BLOOD PRESSURE: 72 MMHG

## 2025-03-27 DIAGNOSIS — R07.89 ATYPICAL CHEST PAIN: Primary | ICD-10-CM

## 2025-03-27 DIAGNOSIS — R06.02 SOB (SHORTNESS OF BREATH): ICD-10-CM

## 2025-03-27 DIAGNOSIS — F41.1 GAD (GENERALIZED ANXIETY DISORDER): ICD-10-CM

## 2025-03-27 DIAGNOSIS — R30.0 DYSURIA: ICD-10-CM

## 2025-03-27 DIAGNOSIS — Z12.4 SCREENING FOR CERVICAL CANCER: ICD-10-CM

## 2025-03-27 DIAGNOSIS — F41.0 PANIC ATTACKS: ICD-10-CM

## 2025-03-27 DIAGNOSIS — E66.01 MORBID OBESITY WITH BMI OF 50.0-59.9, ADULT (HCC): ICD-10-CM

## 2025-03-27 DIAGNOSIS — N30.01 ACUTE CYSTITIS WITH HEMATURIA: ICD-10-CM

## 2025-03-27 DIAGNOSIS — Z23 ENCOUNTER FOR IMMUNIZATION: ICD-10-CM

## 2025-03-27 LAB
SL AMB  POCT GLUCOSE, UA: ABNORMAL
SL AMB LEUKOCYTE ESTERASE,UA: 1
SL AMB POCT BILIRUBIN,UA: ABNORMAL
SL AMB POCT BLOOD,UA: ABNORMAL
SL AMB POCT CLARITY,UA: ABNORMAL
SL AMB POCT COLOR,UA: YELLOW
SL AMB POCT KETONES,UA: ABNORMAL
SL AMB POCT NITRITE,UA: 1
SL AMB POCT PH,UA: 6
SL AMB POCT SPECIFIC GRAVITY,UA: 1.01
SL AMB POCT URINE PROTEIN: ABNORMAL
SL AMB POCT UROBILINOGEN: ABNORMAL

## 2025-03-27 PROCEDURE — 87086 URINE CULTURE/COLONY COUNT: CPT | Performed by: INTERNAL MEDICINE

## 2025-03-27 PROCEDURE — 87186 SC STD MICRODIL/AGAR DIL: CPT | Performed by: INTERNAL MEDICINE

## 2025-03-27 PROCEDURE — 87077 CULTURE AEROBIC IDENTIFY: CPT | Performed by: INTERNAL MEDICINE

## 2025-03-27 PROCEDURE — 81002 URINALYSIS NONAUTO W/O SCOPE: CPT | Performed by: INTERNAL MEDICINE

## 2025-03-27 PROCEDURE — 99214 OFFICE O/P EST MOD 30 MIN: CPT | Performed by: INTERNAL MEDICINE

## 2025-03-27 RX ORDER — PHENAZOPYRIDINE HYDROCHLORIDE 200 MG/1
200 TABLET, FILM COATED ORAL
Qty: 6 TABLET | Refills: 0 | Status: SHIPPED | OUTPATIENT
Start: 2025-03-27

## 2025-03-27 RX ORDER — CIPROFLOXACIN 500 MG/1
500 TABLET, FILM COATED ORAL EVERY 12 HOURS SCHEDULED
Qty: 20 TABLET | Refills: 0 | Status: SHIPPED | OUTPATIENT
Start: 2025-03-27 | End: 2025-04-06

## 2025-03-27 NOTE — PROGRESS NOTES
Name: Ramez Scott      : 1995      MRN: [id number]  Encounter Provider: Grant Salinas DO  Encounter Date: 3/27/2025   Encounter department: [facility]  :  Assessment & Plan  Encounter for immunization         Screening for cervical cancer         SOB (shortness of breath)         Atypical chest pain         Morbid obesity with BMI of 50.0-59.9, adult (HCC)           PERCY (generalized anxiety disorder)         Panic attacks         Dysuria    Orders:    POCT urine dip    ciprofloxacin (CIPRO) 500 mg tablet; Take 1 tablet (500 mg total) by mouth every 12 (twelve) hours for 10 days    Urine culture; Future    phenazopyridine (PYRIDIUM) 200 mg tablet; Take 1 tablet (200 mg total) by mouth 3 (three) times a day with meals    Acute cystitis with hematuria    Orders:    POCT urine dip    ciprofloxacin (CIPRO) 500 mg tablet; Take 1 tablet (500 mg total) by mouth every 12 (twelve) hours for 10 days    Urine culture; Future    phenazopyridine (PYRIDIUM) 200 mg tablet; Take 1 tablet (200 mg total) by mouth 3 (three) times a day with meals    A/P: Stable. Appreciate bariatric input. Suspect s/s not cardiac. Given Benzo helped, ??panic attacks. Since better, will hold on PFT's and will most likely be getting testing(echo/Sleep study) at bariatrics. Discussed the role of PERCY and pt only used three benzo. Will continue prn benzo for now, but if increase use, consider SSRI or buspar. ?urinary s/s. ?UTI. Dip positive leuko, and nitrates with some blood. . Will send for culture. Increase po fluids. Will try pyridium and cipro. 10 day course given recent instrumentation.  RTC one month for PERCY, SOB, CP.        History of Present Illness   WF RTC for a one month f/u atypical CP and SOB. [place] to be pulmonary due to weight with restrictive component and possibly RAD. PFT's ordered,but not performed yet. Labs and imaging were negative previously and neg d dimer. PERCY felt to be contributing. Started on PRN  "benzo. Seen by bariatrics and is entering their program. Reports s/s are better. Notes s/s improve after taking the benzo. Used it three times with good results. New c/o several days of urinary discomfort. Recent sexual encounter and recently had an IUD placed. No fever or chills. No vag d/c or bleeding. .       Review of Systems   Constitutional:  Negative for activity change, chills, diaphoresis, fatigue and fever.   Respiratory:  Negative for cough, chest tightness, shortness of breath and wheezing.    Cardiovascular:  Negative for chest pain, palpitations and leg swelling.   Gastrointestinal:  Negative for abdominal pain, constipation, diarrhea, nausea and vomiting.   Genitourinary:  Positive for dysuria. Negative for decreased urine volume, difficulty urinating, frequency, hematuria, pelvic pain, urgency, vaginal bleeding, vaginal discharge and vaginal pain.   Musculoskeletal:  Negative for arthralgias, gait problem and myalgias.   Neurological:  Negative for dizziness, seizures, syncope, weakness, light-headedness and headaches.   Psychiatric/Behavioral:  Negative for confusion, dysphoric mood and sleep disturbance. The patient is not nervous/anxious.        Objective   /72 (BP Location: Right arm, Patient Position: Sitting)   Pulse 71   Temp (!) 97 °F (36.1 °C) (Tympanic)   Ht 5' 3\" (1.6 m)   Wt 127 kg (279 lb)   LMP 03/19/2025 (Exact Date)   SpO2 99%   BMI 49.42 kg/m²      Physical Exam  Vitals and nursing note reviewed.   Constitutional:       General: She is not in acute distress.     Appearance: Normal appearance. She is not ill-appearing.   HENT:      Head: Normocephalic and atraumatic.      Mouth/Throat:      Mouth: Mucous membranes are moist.   Eyes:      Extraocular Movements: Extraocular movements intact.      Conjunctiva/sclera: Conjunctivae normal.      Pupils: Pupils are equal, round, and reactive to light.   Cardiovascular:      Rate and Rhythm: Normal rate and regular rhythm.      " Heart sounds: Normal heart sounds. No murmur heard.  Pulmonary:      Effort: Pulmonary effort is normal. No respiratory distress.      Breath sounds: Normal breath sounds. No wheezing, rhonchi or rales.   Neurological:      General: No focal deficit present.      Mental Status: She is alert and oriented to person, place, and time. Mental status is at baseline.   Psychiatric:         Mood and Affect: Mood normal.         Behavior: Behavior normal.         Thought Content: Thought content normal.         Judgment: Judgment normal.

## 2025-03-29 LAB — BACTERIA UR CULT: ABNORMAL

## 2025-03-30 ENCOUNTER — RESULTS FOLLOW-UP (OUTPATIENT)
Dept: INTERNAL MEDICINE CLINIC | Facility: CLINIC | Age: 30
End: 2025-03-30

## 2025-04-07 ENCOUNTER — TELEPHONE (OUTPATIENT)
Dept: BARIATRICS | Facility: CLINIC | Age: 30
End: 2025-04-07

## 2025-04-07 NOTE — TELEPHONE ENCOUNTER
Spoke to Ap harmon to inform patient to stop zepbound 7 days prior. No voicemail patient line.  Sent my chart message to confirm egd with Dr Thorpe for 4-

## 2025-04-14 RX ORDER — SODIUM CHLORIDE, SODIUM LACTATE, POTASSIUM CHLORIDE, CALCIUM CHLORIDE 600; 310; 30; 20 MG/100ML; MG/100ML; MG/100ML; MG/100ML
125 INJECTION, SOLUTION INTRAVENOUS CONTINUOUS
Status: CANCELLED | OUTPATIENT
Start: 2025-04-14

## 2025-04-16 ENCOUNTER — HOSPITAL ENCOUNTER (OUTPATIENT)
Dept: GASTROENTEROLOGY | Facility: HOSPITAL | Age: 30
Setting detail: OUTPATIENT SURGERY
Discharge: HOME | End: 2025-04-16
Attending: SURGERY
Payer: COMMERCIAL

## 2025-04-16 ENCOUNTER — ANESTHESIA EVENT (OUTPATIENT)
Dept: GASTROENTEROLOGY | Facility: HOSPITAL | Age: 30
End: 2025-04-16
Payer: COMMERCIAL

## 2025-04-16 ENCOUNTER — ANESTHESIA (OUTPATIENT)
Dept: GASTROENTEROLOGY | Facility: HOSPITAL | Age: 30
End: 2025-04-16
Payer: COMMERCIAL

## 2025-04-16 VITALS
HEART RATE: 79 BPM | OXYGEN SATURATION: 95 % | SYSTOLIC BLOOD PRESSURE: 104 MMHG | RESPIRATION RATE: 18 BRPM | TEMPERATURE: 98 F | DIASTOLIC BLOOD PRESSURE: 53 MMHG

## 2025-04-16 DIAGNOSIS — Z98.84 BARIATRIC SURGERY STATUS: ICD-10-CM

## 2025-04-16 LAB
EXT PREGNANCY TEST URINE: NEGATIVE
EXT. CONTROL: NORMAL

## 2025-04-16 PROCEDURE — 88342 IMHCHEM/IMCYTCHM 1ST ANTB: CPT | Performed by: PATHOLOGY

## 2025-04-16 PROCEDURE — 81025 URINE PREGNANCY TEST: CPT | Performed by: ANESTHESIOLOGY

## 2025-04-16 PROCEDURE — 43239 EGD BIOPSY SINGLE/MULTIPLE: CPT | Performed by: SURGERY

## 2025-04-16 PROCEDURE — 88305 TISSUE EXAM BY PATHOLOGIST: CPT | Performed by: PATHOLOGY

## 2025-04-16 RX ORDER — PROPOFOL 10 MG/ML
INJECTION, EMULSION INTRAVENOUS AS NEEDED
Status: DISCONTINUED | OUTPATIENT
Start: 2025-04-16 | End: 2025-04-16

## 2025-04-16 RX ORDER — SODIUM CHLORIDE, SODIUM LACTATE, POTASSIUM CHLORIDE, CALCIUM CHLORIDE 600; 310; 30; 20 MG/100ML; MG/100ML; MG/100ML; MG/100ML
INJECTION, SOLUTION INTRAVENOUS CONTINUOUS PRN
Status: DISCONTINUED | OUTPATIENT
Start: 2025-04-16 | End: 2025-04-16

## 2025-04-16 RX ORDER — SODIUM CHLORIDE, SODIUM LACTATE, POTASSIUM CHLORIDE, CALCIUM CHLORIDE 600; 310; 30; 20 MG/100ML; MG/100ML; MG/100ML; MG/100ML
125 INJECTION, SOLUTION INTRAVENOUS CONTINUOUS
Status: DISCONTINUED | OUTPATIENT
Start: 2025-04-16 | End: 2025-04-20 | Stop reason: HOSPADM

## 2025-04-16 RX ORDER — TIRZEPATIDE 5 MG/.5ML
5 INJECTION, SOLUTION SUBCUTANEOUS WEEKLY
COMMUNITY
End: 2025-04-17 | Stop reason: SDUPTHER

## 2025-04-16 RX ORDER — LIDOCAINE HCL/PF 100 MG/5ML
SYRINGE (ML) INJECTION AS NEEDED
Status: DISCONTINUED | OUTPATIENT
Start: 2025-04-16 | End: 2025-04-16

## 2025-04-16 RX ADMIN — PROPOFOL 50 MG: 10 INJECTION, EMULSION INTRAVENOUS at 10:38

## 2025-04-16 RX ADMIN — PROPOFOL 50 MG: 10 INJECTION, EMULSION INTRAVENOUS at 10:41

## 2025-04-16 RX ADMIN — PROPOFOL 50 MG: 10 INJECTION, EMULSION INTRAVENOUS at 10:39

## 2025-04-16 RX ADMIN — LIDOCAINE HYDROCHLORIDE 100 MG: 20 INJECTION INTRAVENOUS at 10:36

## 2025-04-16 RX ADMIN — PROPOFOL 150 MG: 10 INJECTION, EMULSION INTRAVENOUS at 10:36

## 2025-04-16 RX ADMIN — SODIUM CHLORIDE, SODIUM LACTATE, POTASSIUM CHLORIDE, AND CALCIUM CHLORIDE 125 ML/HR: .6; .31; .03; .02 INJECTION, SOLUTION INTRAVENOUS at 10:04

## 2025-04-16 RX ADMIN — SODIUM CHLORIDE, SODIUM LACTATE, POTASSIUM CHLORIDE, AND CALCIUM CHLORIDE: .6; .31; .03; .02 INJECTION, SOLUTION INTRAVENOUS at 10:28

## 2025-04-16 NOTE — ANESTHESIA POSTPROCEDURE EVALUATION
Post-Op Assessment Note    CV Status:  Stable  Pain Score: 0    Pain management: adequate       Mental Status:  Alert and awake   Hydration Status:  Euvolemic   PONV Controlled:  Controlled   Airway Patency:  Patent  Two or more mitigation strategies used for obstructive sleep apnea   Post Op Vitals Reviewed: Yes    No anethesia notable event occurred.    Staff: CRNA, Anesthesiologist           Last Filed PACU Vitals:  Vitals Value Taken Time   Temp     Pulse 89    /78    Resp 18    SpO2 100        Modified Riya:     Vitals Value Taken Time   Activity 1 04/16/25 1047   Respiration 2 04/16/25 1047   Circulation 2 04/16/25 1047   Consciousness 1 04/16/25 1047   Oxygen Saturation 2 04/16/25 1047     Modified Riya Score: 8

## 2025-04-16 NOTE — H&P
This is a 29 y.o. female with a history of morbid obesity and There is no height or weight on file to calculate BMI.  Here for an EGD to evaluate the anatomy of the GI tract.    Physical Exam    /71   Pulse 67   Resp 18   LMP 04/13/2025 (Exact Date)   SpO2 99%    AAOx3  RRR  CTA B  Abdomen obese. Benign.      A/P:    This is a 29 y.o. female with a history of morbid obesity and There is no height or weight on file to calculate BMI..    Will proceed with the EGD and biopsies.      Fahad Thorpe MD  04/16/25  10:33 AM

## 2025-04-16 NOTE — ANESTHESIA PREPROCEDURE EVALUATION
Procedure:  EGD    Relevant Problems   ANESTHESIA (within normal limits)      CARDIO (within normal limits)      GI/HEPATIC (within normal limits)      PULMONARY (within normal limits)      Other   (+) BMI 45.0-49.9, adult (HCC)        Physical Exam    Airway    Mallampati score: II  TM Distance: >3 FB  Neck ROM: full     Dental   No notable dental hx     Cardiovascular  Cardiovascular exam normal    Pulmonary  Pulmonary exam normal     Other Findings        Anesthesia Plan  ASA Score- 3     Anesthesia Type- IV sedation with anesthesia with ASA Monitors.         Additional Monitors:     Airway Plan:            Plan Factors-    Chart reviewed.    Patient summary reviewed.                  Induction- intravenous.    Postoperative Plan-         Informed Consent- Anesthetic plan and risks discussed with patient.        NPO Status:  Vitals Value Taken Time   Date of last liquid 04/15/25 04/16/25 0941   Time of last liquid 2300 04/16/25 0941   Date of last solid 04/15/25 04/16/25 0941   Time of last solid 1900 04/16/25 0941

## 2025-04-17 ENCOUNTER — OFFICE VISIT (OUTPATIENT)
Dept: BARIATRICS | Facility: CLINIC | Age: 30
End: 2025-04-17
Payer: COMMERCIAL

## 2025-04-17 VITALS
DIASTOLIC BLOOD PRESSURE: 60 MMHG | OXYGEN SATURATION: 99 % | RESPIRATION RATE: 18 BRPM | HEART RATE: 58 BPM | HEIGHT: 63 IN | SYSTOLIC BLOOD PRESSURE: 100 MMHG | BODY MASS INDEX: 48.58 KG/M2 | WEIGHT: 274.2 LBS | TEMPERATURE: 97.8 F

## 2025-04-17 DIAGNOSIS — E66.01 MORBID OBESITY (HCC): Primary | ICD-10-CM

## 2025-04-17 DIAGNOSIS — R73.03 PREDIABETES: ICD-10-CM

## 2025-04-17 PROCEDURE — 99214 OFFICE O/P EST MOD 30 MIN: CPT | Performed by: PHYSICIAN ASSISTANT

## 2025-04-17 RX ORDER — TIRZEPATIDE 5 MG/.5ML
5 INJECTION, SOLUTION SUBCUTANEOUS WEEKLY
Qty: 2 ML | Refills: 3 | Status: SHIPPED | OUTPATIENT
Start: 2025-04-17 | End: 2025-08-07

## 2025-04-17 NOTE — ASSESSMENT & PLAN NOTE
- Patient is interested in pursuing Sleeve Gastrectomy with Dr. Thorpe. This is her 3/6 visit.   - Screening labs: Pending in system  - Calorie goal: 2733-1735 calories per day  - Lost 18lbs since last visit, no significant side effects  - Reviewed EGD results from yesterday, pathology pending  - Continue Zepbound 5mg, sent enough to last until follow up   - Will follow up in late August after surgical pathway complete. Patient can drop to BMI 40 before surgery, call with any concerns prior  - Medication agreement signed: Yes, at consult  - Follow up with me in August and as scheduled in [place] for surgical pathway.   - Dietician: Agreeable in the future

## 2025-04-17 NOTE — PROGRESS NOTES
Assessment/Plan:    BMI 45.0-49.9, adult (HCC)  - Patient is interested in pursuing Sleeve Gastrectomy with Dr. Thorpe. This is her 3/6 visit.   - Screening labs: Pending in system  - Calorie goal: 2041-8962 calories per day  - Lost 18lbs since last visit, no significant side effects  - Reviewed EGD results from yesterday, pathology pending  - Continue Zepbound 5mg, sent enough to last until follow up   - Will follow up in late August after surgical pathway complete. Patient can drop to BMI 40 before surgery, call with any concerns prior  - Medication agreement signed: Yes, at consult  - Follow up with me in August and as scheduled in [place] for surgical pathway.   - Dietician: Agreeable in the future       Prediabetes  - recent A1C 5.7  - Hoping to improve with weight loss        Goals:    Food log (ie.) www.[email or web address],sparkpeople.com,loseit.com,calorieking.com,etc.   No sugary beverages. At least 64oz of water daily.  Increase physical activity by 10 minutes daily. Gradually increase physical activity to a goal of 5 days per week for 30 minutes of MODERATE intensity PLUS 2 days per week of FULL BODY resistance training    Calorie goal: 8098-9177 calories per day  Food log (ie.) www.[email or web address],[email or web address],[email or web address],calorieking.com,etc.   No sugary beverages. At least 64oz of water daily.  Goal protein intake of 60-80 grams per day  25-35 grams of dietary fiber per day    Follow up in approximately  4 months  with Surgical Advanced Practitioner.     Diagnoses and all orders for this visit:    Morbid obesity (HCC)  -     tirzepatide (Zepbound) 5 mg/0.5 mL auto-injector; Inject 0.5 mL (5 mg total) under the skin once a week    BMI 45.0-49.9, adult (HCC)    Prediabetes          Subjective:   Chief Complaint   Patient presents with    Follow-up        Patient ID: Ramez Scott  is a 29 y.o. female with excess weight/obesity here to pursue weight managment.  Patient is pursuing Vertical Sleeve  Gastrectomy with Dr. Maurilio ANDREWS    Weight at last visit: 292 lb  Current weight: 274 lb  Change: -18 lbs in 2 months    On Zepbound 5mg without significant side effects.   Labs reviewed, A1C 5.7 prediabetic, low HDL, TSH WNL, CMP WNL    Wt Readings from Last 10 Encounters:   04/17/25 124 kg (274 lb 3.2 oz)   03/27/25 127 kg (279 lb)   03/20/25 128 kg (281 lb 9.6 oz)   03/18/25 127 kg (281 lb)   02/26/25 130 kg (287 lb 8 oz)   02/21/25 131 kg (289 lb)   02/20/25 132 kg (292 lb)   02/07/25 132 kg (291 lb 3.2 oz)   02/03/25 132 kg (291 lb)   07/16/24 128 kg (282 lb 8 oz)      Eating more regularly, focusing on protein intake. Cut out nighttime snacking. Getting about 5k steps a day.    Food logging:  B: Coffee + Urdu vanilla creamer  S: skips  L: Chicken wrap or sandwich on honey wheat   S: skips  D: Tacos or chicken + mashed potatoes + veggies  S: Protein loaded snack     Hunger/Cravings: Sweets  Dining out: Once a month  Hydration: 5-6 16oz bottles of water, occasional soda  Alcohol: Rare  Smoking: Denies  Exercise: Denies  Weight Monitoring:  Not that often   Sleep: 6-8 hours of sleep  STOP-BANG Score: 3/8  Occupation: Unemployed  Contraception: None, was on depo and gained weight   Colonoscopy: N/A     Patient denies personal and family history of pancreatitis, thyroid cancer, MEN-2 tumors.  Denies any hx of glaucoma, seizures, kidney stones, gallstones.  Denies Hx of CAD, PAD, palpitations, arrhythmia.   Denies uncontrolled anxiety or depression, suicidal behavior or thinking, insomnia or sleep disturbance.       The following portions of the patient's history were reviewed and updated as appropriate: allergies, current medications, past family history, past medical history, past social history, past surgical history, and problem list.    Review of Systems   Constitutional:  Negative for chills and fever.   HENT:  Negative for ear pain and sore throat.    Eyes:  Negative for pain and visual disturbance.  "  Respiratory:  Negative for cough and shortness of breath.    Cardiovascular:  Negative for chest pain and palpitations.   Gastrointestinal:  Negative for abdominal pain and vomiting.   Genitourinary:  Negative for dysuria and hematuria.   Musculoskeletal:  Negative for arthralgias and back pain.   Skin:  Negative for color change and rash.   Neurological:  Negative for seizures and syncope.   All other systems reviewed and are negative.      Objective:    /60 (BP Location: Right arm, Patient Position: Sitting, Cuff Size: Large)   Pulse 58   Temp 97.8 °F (36.6 °C) (Temporal)   Resp 18   Ht 5' 3\" (1.6 m)   Wt 124 kg (274 lb 3.2 oz)   LMP 04/13/2025 (Exact Date)   SpO2 99%   BMI 48.57 kg/m²      Physical Exam  Constitutional:       Appearance: Normal appearance. She is obese.   HENT:      Head: Normocephalic and atraumatic.      Nose: Nose normal.      Mouth/Throat:      Mouth: Mucous membranes are moist.   Eyes:      Extraocular Movements: Extraocular movements intact.      Pupils: Pupils are equal, round, and reactive to light.   Cardiovascular:      Rate and Rhythm: Normal rate and regular rhythm.      Pulses: Normal pulses.      Heart sounds: Normal heart sounds.   Pulmonary:      Effort: Pulmonary effort is normal.      Breath sounds: Normal breath sounds.   Abdominal:      Palpations: Abdomen is soft.   Musculoskeletal:      Cervical back: Normal range of motion.   Skin:     General: Skin is warm.   Neurological:      General: No focal deficit present.      Mental Status: She is alert and oriented to person, place, and time.   Psychiatric:         Mood and Affect: Mood normal.         Behavior: Behavior normal.        "

## 2025-04-21 PROCEDURE — 88305 TISSUE EXAM BY PATHOLOGIST: CPT | Performed by: PATHOLOGY

## 2025-04-21 PROCEDURE — 88342 IMHCHEM/IMCYTCHM 1ST ANTB: CPT | Performed by: PATHOLOGY

## 2025-05-16 ENCOUNTER — CLINICAL SUPPORT (OUTPATIENT)
Dept: BARIATRICS | Facility: CLINIC | Age: 30
End: 2025-05-16

## 2025-05-16 VITALS — WEIGHT: 265.4 LBS | BODY MASS INDEX: 47.01 KG/M2

## 2025-05-16 DIAGNOSIS — E66.813 OBESITY, CLASS III, BMI 40-49.9 (MORBID OBESITY): Primary | ICD-10-CM

## 2025-05-16 PROCEDURE — RECHECK: Performed by: DIETITIAN, REGISTERED

## 2025-05-16 NOTE — PROGRESS NOTES
Bariatric Nutrition Assessment Note    Type of surgery    Preop- sleeve   Surgery Date: Expected date: Oct/Nov   6 month program requirement - today is 4/6 of program requirement   Surgeon: Xiomara Surgeons: Dr. Thorpe    Saw [facility] provider and was started on zepbound - F/U in August     Nutrition Assessment   Ramez Scott  29 y.o.  female     Wt with BMI of 25: 135.9 lbs   LMP 04/13/2025 (Exact Date)     Starting wt with zepbound - 292 lbs  Current wt - 265.4  Wt loss -26.6 lbs  % wt loss 9% x 3 months     Wt Readings from Last 3 Encounters:   04/17/25 124 kg (274 lb 3.2 oz)   03/27/25 127 kg (279 lb)   03/20/25 128 kg (281 lb 9.6 oz)      Tere Schumacher Equation:    OWE=6869 (50% = 949- do not go below 1000 calories per day to maintain metabolism )   Weight Maintenance 2278  Estimated calories for weight loss 4237-8728 ( 1-2# per wk wt loss - sedentary )  Estimated protein needs 62-93 grams per day (1.0-1.5 gms/kg IBW )   Fluids:  125.39  oz total ( Love-Segar method )   Fluid Requirement Calculator  Free Fluid  100  oz free fluid (Love-Segar method; -20%)  Minimum fluid requirement -75 oz per day ( 35 ml/kg IBW)     Weight History   Onset of Obesity: Adult- after birth of first daughter   Family history of obesity: No  Wt Loss Attempts: Counseling with  MD  Exercise  Self Created Diets (Portion Control, Healthy Food Choices, etc.)  Medication   Patient has tried the above for 6 months or more with insufficient weight loss or weight regain, which is why patient has requested to be evaluated for weight loss surgery today  Maximum Wt Lost: 20# - diet and exercise    Review of History and Medications   Past Medical History:   Diagnosis Date    Chlamydia     in the past    Varicella     had vaccine     Past Surgical History:   Procedure Laterality Date    FRACTURE SURGERY  2015    right hand    UPPER GASTROINTESTINAL ENDOSCOPY  04/16/2025     Social History     Socioeconomic History    Marital status:  Single     Spouse name: Not on file    Number of children: Not on file    Years of education: Not on file    Highest education level: Not on file   Occupational History    Not on file   Tobacco Use    Smoking status: Former     Current packs/day: 0.25     Average packs/day: 0.3 packs/day for 5.2 years (1.3 ttl pk-yrs)     Types: Cigarettes     Passive exposure: Never    Smokeless tobacco: Never    Tobacco comments:     Quit 2024   Vaping Use    Vaping status: Never Used   Substance and Sexual Activity    Alcohol use: Not Currently     Comment: Social drink    Drug use: Never    Sexual activity: Yes     Partners: Male     Birth control/protection: Condom Male   Other Topics Concern    Not on file   Social History Narrative    Single    Five children    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     Social Drivers of Health     Financial Resource Strain: Not on file   Food Insecurity: No Food Insecurity (6/14/2024)    Nursing - Inadequate Food Risk Classification     Worried About Running Out of Food in the Last Year: Never true     Ran Out of Food in the Last Year: Never true     Ran Out of Food in the Last Year: Not on file   Transportation Needs: No Transportation Needs (6/14/2024)    PRAPARE - Transportation     Lack of Transportation (Medical): No     Lack of Transportation (Non-Medical): No   Physical Activity: Not on file   Stress: Not on file   Social Connections: Not on file   Intimate Partner Violence: Not on file   Housing Stability: Low Risk  (6/14/2024)    Housing Stability Vital Sign     Unable to Pay for Housing in the Last Year: No     Number of Times Moved in the Last Year: 1     Homeless in the Last Year: No   Recent Concern: Housing Stability - High Risk (5/1/2024)    Housing Stability Vital Sign     Unable to Pay for Housing in the Last Year: No     Number of Times Moved in the Last Year: 2     Homeless in the Last Year: No       Current Outpatient Medications:      ALPRAZolam (XANAX) 0.5 mg tablet, Take 1 tablet (0.5 mg total) by mouth 3 (three) times a day as needed for anxiety, Disp: 30 tablet, Rfl: 0    phenazopyridine (PYRIDIUM) 200 mg tablet, Take 1 tablet (200 mg total) by mouth 3 (three) times a day with meals, Disp: 6 tablet, Rfl: 0    tirzepatide (Zepbound) 5 mg/0.5 mL auto-injector, Inject 0.5 mL (5 mg total) under the skin once a week, Disp: 2 mL, Rfl: 3    Take biotin and collagen for hair health     Food Intake and Lifestyle Assessment   Food Intake Assessment completed via usual diet recall  Breakfast: 7 am drinks water   Fair life shake   Snack: 0   Lunch: 11 to 12 noon - left overs for night before   Chicken and vegetable and potatoes   Snack: hard boiled egg or quest protein   Dinner: 6 to 7 pm lean protein vegetable and starch   Lots of chicken , uses ground turkey instead of ground beef   Snack: no  Beverage intake: water  Protein supplement: one per day [facility]   Estimated protein intake per day: 60-70 grams   Estimated fluid intake per day: >70 oz or more per day   Meals eaten away from home: rarely   Typical meal pattern: 3 meals per day and 1 snacks per day  Eating Behaviors: Craves sweet foods- resolved with mediation   Food allergies or intolerances: No Known Allergies  Cultural or Druze considerations: N/A    Physical Assessment  Physical Activity  Types of exercise: Walking 4-5 walks days per week for 30 minutes with her children   Aiming for 10 000 steps per day - averaging 5000 steps per day   Current physical limitations: knee were hurting but getting better with weight loss     Psychosocial Assessment   Support systems: spouse and mother   Socioeconomic factors: full time mother with 5 children   Lives with fiancee     Nutrition Diagnosis  Diagnosis: Overweight / Obesity (NC-3.3)  Related to: Physical inactivity and Excessive energy intake  As Evidenced by: BMI >25 and Unintentional weight gain     Nutrition Prescription: Recommend the  "following diet  1500 calories/ 95 grams protein (25% of calories )  100 oz of calorie free beverages per day     Interventions and Teaching   Discussed pre-op and post-op nutrition guidelines.       Patient educated and handouts provided.  Surgical changes to stomach / GI  Capacity of post-surgery stomach  Diet progression  Adequate hydration  Sugar and fat restriction to decrease \"dumping syndrome\"  Fat restriction to decrease steatorrhea  Expected weight loss  Weight loss plateaus/ possibility of weight regain  Exercise  Suggestions for pre-op diet  Nutrition considerations after surgery  Protein supplements  Meal planning and preparation  Appropriate carbohydrate, protein, and fat intake, and food/fluid choices to maximize safe weight loss, nutrient intake, and tolerance   Dietary and lifestyle changes  Possible problems with poor eating habits  Intuitive eating  Techniques for self monitoring and keeping daily food journal  Potential for food intolerance after surgery, and ways to deal with them including: lactose intolerance, nausea, reflux, vomiting, diarrhea, food intolerance, appetite changes, gas  Vitamin / Mineral supplementation of Multivitamin with minerals, Calcium, Vitamin B12, Iron, Fat Soluble vitamins, and Vitamin D  Post-operative pregnancy guidelines- Pt is having IUD placed   Patient is not currently pregnant and doesn't desire to become pregnant a minimum of one year post-op    Education provided to: patient    Barriers to learning: No barriers identified    Readiness to change: action    Prior research on procedure: discussed with provider    Comprehension: demonstrated understanding and verbalizes understanding     Expected Compliance: good    Recommendations  Pt is an appropriate candidate for surgery. Yes    Workflow: (Incomplete in Bold):  Psych and/or D+A Clearance: n/a  Blood Work: 2/20/2025  PCP letter: done  Surgeon Appt: done  EGD: 4/16/2025  Cardiac Risk Assessment with ECG: " 7/2/2025  Sleep Studies: n/a  Nicotine test: n/a  Pre-Operative Program: 4/6 program requirement   NAFLD Score Calculated: n/a  Hepatology consult: n/a  Under Initial Office weight: yes      Evaluation / Monitoring  Dietitian to Monitor: Eating pattern as discussed Tolerance of nutrition prescription Body weight Lab values Physical activity Bowel pattern  Pt has been food logging 1307-1890 calories, 65-95 grams protein per day.  Admits that it is inconsistent but she is mindful concerning her food choices and portions.  She joined a gym and goes 2 x per week, on off days she will walk ( weather permitting ).  She is drinking ~70 oz of calories free beverages plus on protein shake per day for total of 81 oz per day. She is not allowed to have liquids while working, discussed getting an accomodation letter for first 8 weeks post op to allow liquids at her work station due to medical reasons.  Since starting zepbound, she has lost 9% of her total body weight in preparation for surgery.  Overall, she is making significant lifestyle changes in preparation for surgery.     Goals  Food journal, Exercise 30 minutes 5 times per week, Complete lession plans 1-6, and Eat 3 meals per day  Food log 1500 calories/ 95 grams protein   Recommend and adult multivitamin and mineral and 2000 IU of D3 per day in preparation for surgery   100 oz of calories free beverages per day     Time Spent:   30 minutes

## 2025-05-20 ENCOUNTER — APPOINTMENT (OUTPATIENT)
Dept: URGENT CARE | Facility: CLINIC | Age: 30
End: 2025-05-20

## 2025-05-20 ENCOUNTER — APPOINTMENT (OUTPATIENT)
Dept: PHYSICAL THERAPY | Facility: CLINIC | Age: 30
End: 2025-05-20

## 2025-05-20 PROCEDURE — 97530 THERAPEUTIC ACTIVITIES: CPT

## 2025-05-28 DIAGNOSIS — R07.89 ATYPICAL CHEST PAIN: ICD-10-CM

## 2025-05-28 DIAGNOSIS — R06.02 SOB (SHORTNESS OF BREATH): ICD-10-CM

## 2025-05-28 RX ORDER — ALPRAZOLAM 0.5 MG
0.5 TABLET ORAL 3 TIMES DAILY PRN
Qty: 30 TABLET | Refills: 0 | Status: SHIPPED | OUTPATIENT
Start: 2025-05-28

## 2025-05-29 ENCOUNTER — TELEPHONE (OUTPATIENT)
Dept: INTERNAL MEDICINE CLINIC | Facility: CLINIC | Age: 30
End: 2025-05-29

## 2025-05-29 NOTE — TELEPHONE ENCOUNTER
----- Message from Grant Salinas DO sent at 5/28/2025  4:09 PM EDT -----  One month script sent in. Pt is overdue for f/u appt.

## 2025-05-29 NOTE — TELEPHONE ENCOUNTER
Lvm to call the office. The patient needs to schedule a follow up appt to continue getting medication.

## 2025-06-12 NOTE — PROGRESS NOTES
Bariatric Behavioral Health Follow Up     ZEPBOUND 5mg      5 / 6 weight check     Surgeon:  Dr. Thorpe     Starting weight:  287.5 #  Today's weight: 260.2 #    Cardiology scheduled for 7/2/2025 6 / 6 weight check scheduled 7/18/2025    Eating more protein and smaller serving sizes.  Utilizing protein shakes.  Exercising. Obtained a job - [employer].  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   Will most likely stay at [employer] with reduced hours.    Eating slow and chewing down food.  Zepbound is working with head noise.     Premier shakes.   Fairlife     Some tracking of food.  Feels food is repetitive and some days not tracking.   Still reports supportive household.    Reading the bariatric manual and completing lesson plans.  Has book marked tabs on her manual.  No plans for additional children currently.

## 2025-06-13 ENCOUNTER — CLINICAL SUPPORT (OUTPATIENT)
Dept: BARIATRICS | Facility: CLINIC | Age: 30
End: 2025-06-13

## 2025-06-13 VITALS — BODY MASS INDEX: 46.11 KG/M2 | WEIGHT: 260.3 LBS

## 2025-06-13 PROCEDURE — RECHECK

## 2025-06-23 ENCOUNTER — TELEPHONE (OUTPATIENT)
Dept: BARIATRICS | Facility: CLINIC | Age: 30
End: 2025-06-23

## 2025-07-02 ENCOUNTER — OFFICE VISIT (OUTPATIENT)
Dept: CARDIOLOGY CLINIC | Facility: CLINIC | Age: 30
End: 2025-07-02
Payer: COMMERCIAL

## 2025-07-02 VITALS
BODY MASS INDEX: 45.46 KG/M2 | DIASTOLIC BLOOD PRESSURE: 60 MMHG | SYSTOLIC BLOOD PRESSURE: 110 MMHG | OXYGEN SATURATION: 97 % | WEIGHT: 256.6 LBS | HEART RATE: 70 BPM | HEIGHT: 63 IN

## 2025-07-02 DIAGNOSIS — E66.01 MORBID OBESITY (HCC): ICD-10-CM

## 2025-07-02 DIAGNOSIS — Z01.810 PREOP CARDIOVASCULAR EXAM: Primary | ICD-10-CM

## 2025-07-02 PROCEDURE — 99203 OFFICE O/P NEW LOW 30 MIN: CPT | Performed by: INTERNAL MEDICINE

## 2025-07-02 NOTE — PROGRESS NOTES
"Consultation - Cardiology   Ramez Scott 29 y.o. female MRN: [id number]  Unit/Bed#:  Encounter: [id number]  Physician Requesting Consult: No att. providers found  Reason for Consult / Principal Problem: Pre op cardiac evaluation    Assessment:  Obesity    Plan:  She has not cardiac history and is very active with no cardiac symptoms. ECG is normal.  I feel that her cardiac risk for bariatric surgery is low and she is cleared with no further cardiac testing needed.    History of Present Illness     HPI: Ramez Scott is a 29 y.o. year old female who denies any past cardiac history. She denies HTN, DM, FH of CAD. She is a former smoker.  She has 5 kids and is very active. She does a lot of walking. She denies CP, significant SOB, LE edema. She does 10,000 steps a day.  ECG 2/21/2025 - NSR, normal ECG    /60  Wt 256 lbs        Review of Systems:    Alert awake oriented, comfortable, denies any complaints  No fevers chills nausea vomiting  No weakness, dizziness, seizures  no cough, shortness of breath, or wheezing  Denies any palpitations, chest pain, diaphoresis  Denies leg edema, pain or paresthesias  Denies any skin rashes  Denies abdominal pain, bloody stools, masses  Denies any depression or suicidal ideations      Historical Information   Past Medical History[1]  Past Surgical History[2]  Social History     Substance and Sexual Activity   Alcohol Use Not Currently    Comment: Social drink     Social History     Substance and Sexual Activity   Drug Use Never     Tobacco Use History[3]  Family History: Family history non-contributory    Meds/Allergies   all current active meds have been reviewed  Allergies[4]    Objective   Vitals: Blood pressure 110/60, pulse 70, height 5' 3\" (1.6 m), weight 116 kg (256 lb 9.6 oz), SpO2 97%, not currently breastfeeding., Body mass index is 45.45 kg/m².,   Weight (last 2 days)       Date/Time Weight    07/02/25 0950 116 (256.6)                      Physical Exam:  GEN: " "Ramez Scott appears well, alert and oriented x 3, pleasant and cooperative   HEENT: pupils equal, round, and reactive to light; extraocular muscles intact  NECK: supple, no carotid bruits   HEART: regular rhythm, normal S1 and S2, no murmurs, clicks, gallops or rubs   LUNGS: clear to auscultation bilaterally; no wheezes, rales, or rhonchi   ABDOMEN: normal bowel sounds, soft, no tenderness, no distention  EXTREMITIES: peripheral pulses normal; no clubbing, cyanosis, or edema  NEURO: no focal findings   SKIN: normal without suspicious lesions on exposed skin    Lab Results:   No visits with results within 1 Day(s) from this visit.   Latest known visit with results is:   Hospital Outpatient Visit on 04/16/2025   Component Date Value Ref Range Status    EXT Preg Test, Ur 04/16/2025 Negative  Negative Final    Control 04/16/2025 Valid  Valid Final    Case Report 04/16/2025    Final                    Value:Surgical Pathology Report                         Case: [id number]                                  Authorizing Provider:  Fahad Thorpe MD        Collected:           04/16/2025 1037              Ordering Location:     [facility]        Received:            04/16/2025 [address] [place] [address] Endoscopy                                                          Pathologist:           Bryant Chappell MD                                                           Specimen:    Stomach, r/o h.pylori                                                                      Final Diagnosis 04/16/2025    Final                    Value:Stomach, \"Stomach biopsy, rule out H. pylori,\" Biopsy:  - Antral mucosa with mild chronic inactive gastritis  - Negative for intestinal metaplasia  - Negative for curvilinear Helicobacter microorganisms, supported by immunohistochemistry  - Additional levels examined         Additional Information 04/16/2025    Final                    Value:All reported " "additional testing was performed with appropriately reactive controls.  These tests were developed and their performance characteristics determined by [facility] or appropriate performing facility, though some tests may be performed on tissues which have not been validated for performance characteristics (such as staining performed on alcohol exposed cell blocks and decalcified tissues).  Results should be interpreted with caution and in the context of the patients’ clinical condition. These tests may not be cleared or approved by the U.S. Food and Drug Administration, though the FDA has determined that such clearance or approval is not necessary. These tests are used for clinical purposes and they should not be regarded as investigational or for research. This laboratory has been approved by [facility], designated as a high-complexity laboratory and is qualified to perform these tests.  .Interpretation performed at [facility], [address] [place] [id number]        Gross Description 04/16/2025    Final                    Value:A. The specimen is received in formalin, labeled with the patient's name and hospital number, and is designated \" stomach biopsy, rule out H. pylori\".  The specimen consists of multiple tan-pink irregularly-shaped tissue fragments measuring in aggregate of 1.5 x 0.6 x 0.2 cm.  The specimen is entirely submitted in a screened cassette.    Note: The estimated total formalin fixation time based upon information provided by the submitting clinician and the standard processing schedule is under 72 hours. Jett Bedolla      Clinical Information 04/16/2025    Final                    Value:FINDINGS:  · Mild, generalized erythematous mucosa in the stomach; performed 6 cold forceps biopsies to rule out H. pylori  · Regular Z-line 36 cm from the incisors  · Small sliding hiatal hernia (type I hiatal hernia) without Srinivasa lesions present:  Hill " classification: Grade II                       Imaging: Results Review Statement: No pertinent imaging studies reviewed.                                 [1]   Past Medical History:  Diagnosis Date    Chlamydia     in the past    Varicella     had vaccine   [2]   Past Surgical History:  Procedure Laterality Date    FRACTURE SURGERY  2015    right hand    UPPER GASTROINTESTINAL ENDOSCOPY  04/16/2025   [3]   Social History  Tobacco Use   Smoking Status Former    Current packs/day: 0.25    Average packs/day: 0.3 packs/day for 5.2 years (1.3 ttl pk-yrs)    Types: Cigarettes    Passive exposure: Never   Smokeless Tobacco Never   Tobacco Comments    Quit 2024   [4] No Known Allergies

## 2025-07-12 ENCOUNTER — PATIENT MESSAGE (OUTPATIENT)
Dept: OBGYN CLINIC | Facility: CLINIC | Age: 30
End: 2025-07-12

## 2025-07-14 ENCOUNTER — NURSE TRIAGE (OUTPATIENT)
Age: 30
End: 2025-07-14

## 2025-07-14 NOTE — TELEPHONE ENCOUNTER
"REASON FOR CONVERSATION: Vaginal Discharge    SYMPTOMS: Patient states she started with thick white vaginal discharge , burning and itching last Tuesday 7/8. No blisters, vaginal area is red and swollen. Tried OTC Monistat  did a few days treatment and not helping. Patient states this is her usual yeast infection symptoms. No fever, no bleeding, no UTI s/sx.     OTHER HEALTH INFORMATION: last office appointment Dr. Ledbetter 3/20/25     PROTOCOL DISPOSITION: See Within 3 Days in Office    CARE ADVICE PROVIDED: General genital hygiene- Keep genital area clean and dry.  Wash the vulva area only with water if possible, otherwise mild/sensitive soap. Gently pat dry after washing.  Wear loose fitting clothing, cotton underwear during the day and no underwear at night, avoid any new or scented soaps, lotions, or detergents in the vaginal area. Avoid douching and feminine hygiene products. Can try taking an over the counter probiotic. Use cool compress for itching. Call if symptoms do not improve or Diflucan or if yellow /green discharge, fever, increase pain or any other concerning symptoms seek UC/Ed. Patient verbalzied understanding.    PRACTICE FOLLOW-UP:  msg to Dr. Ledbetter.  HP to office clerical for appointment scheduling.       Reason for Disposition   Symptoms of a yeast infection' (i.e., itchy, white discharge, not bad smelling) and not improved > 3 days following Care Advice    Answer Assessment - Initial Assessment Questions  1. DISCHARGE: \"Describe the discharge.\" (e.g., white, yellow, green, gray, foamy, cottage cheese-like)      White thick vaginal and itching   2. ODOR: \"Is there a bad odor?\"      Denies   3. ONSET: \"When did the discharge begin?\"      Tuesday 7/8  4. RASH: \"Is there a rash in the genital area?\" If Yes, ask: \"Describe it.\" (e.g., redness, blisters, sores, bumps)      Denies   5. ABDOMEN PAIN: \"Are you having any abdomen pain?\" If Yes, ask: \"What does it feel like? \" (e.g., crampy, dull, " "intermittent, constant)       Mild cramping comes and goes   6. ABDOMEN PAIN SEVERITY: If present, ask: \"How bad is it?\" (e.g., Scale 1-10; mild, moderate, or severe)      Mild   7. CAUSE: \"What do you think is causing the discharge?\" \"Have you had the same problem before?\" \"What happened then?\"      Yeast   8. OTHER SYMPTOMS: \"Do you have any other symptoms?\" (e.g., fever, itching, vaginal bleeding, pain with urination, injury to genital area, vaginal foreign body)      Denies fever, no bleeding, some nausea unsure but son also sick  9. PREGNANCY: \"Is there any chance you are pregnant?\" \"When was your last menstrual period?\"      LMP 2 weeks ago, has Paraguard    Protocols used: Vaginal Discharge-Adult-[place]    "

## 2025-07-16 ENCOUNTER — TELEPHONE (OUTPATIENT)
Dept: OBGYN CLINIC | Facility: CLINIC | Age: 30
End: 2025-07-16

## 2025-07-16 NOTE — PROGRESS NOTES
Bariatric Nutrition Assessment Note    Type of surgery    Preop- sleeve   Surgery Date: Expected date: Oct/Nov   6 month program requirement - today is 6/6 of program requirement   Surgeon: Xiomara Surgeons: Dr. Thorpe    Saw [facility] provider and was started on zepbound - F/U in August    Understands that she will need to stop one week prior to her surgery date.     Nutrition Assessment   Ramez Scott  29 y.o.  female     Wt with BMI of 25: 135.9 lbs   There were no vitals taken for this visit.    Starting wt with zepbound - 292 lbs  Current wt - 255.8  Wt loss -36.2 lbs  % wt loss 12.4 % x 5 months     Wt Readings from Last 3 Encounters:   07/02/25 116 kg (256 lb 9.6 oz)   06/13/25 118 kg (260 lb 4.8 oz)   05/16/25 120 kg (265 lb 6.4 oz)      Tere Schumacher Equation:    SFO=6049 (50% = 949- do not go below 1000 calories per day to maintain metabolism )   Weight Maintenance 2225  Estimated calories for weight loss 0374-7738( 1-2# per wk wt loss - sedentary )  Estimated protein needs 62-93 grams per day (1.0-1.5 gms/kg IBW )   Fluids:  125.39  oz total ( [place]-Segar method )   Fluid Requirement Calculator  Free Fluid  100  oz free fluid ([place]-Segar method; -20%)  Minimum fluid requirement -75 oz per day ( 35 ml/kg IBW)     Weight History   Onset of Obesity: Adult- after birth of first daughter   Family history of obesity: No  Wt Loss Attempts: Counseling with  MD  Exercise  Self Created Diets (Portion Control, Healthy Food Choices, etc.)  Medication   Patient has tried the above for 6 months or more with insufficient weight loss or weight regain, which is why patient has requested to be evaluated for weight loss surgery today  Maximum Wt Lost: 20# - diet and exercise    Review of History and Medications   Past Medical History:   Diagnosis Date    Chlamydia     in the past    Varicella     had vaccine     Past Surgical History:   Procedure Laterality Date    FRACTURE SURGERY  2015    right hand    UPPER  GASTROINTESTINAL ENDOSCOPY  04/16/2025     Social History     Socioeconomic History    Marital status: Single   Tobacco Use    Smoking status: Former     Current packs/day: 0.25     Average packs/day: 0.3 packs/day for 5.2 years (1.3 ttl pk-yrs)     Types: Cigarettes     Passive exposure: Never    Smokeless tobacco: Never    Tobacco comments:     Quit 2024   Vaping Use    Vaping status: Never Used   Substance and Sexual Activity    Alcohol use: Not Currently     Comment: Social drink    Drug use: Never    Sexual activity: Yes     Partners: Male     Birth control/protection: Condom Male   Social History Narrative    Single    Five children    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     Social Drivers of Health     Food Insecurity: No Food Insecurity (6/14/2024)    Nursing - Inadequate Food Risk Classification     Worried About Running Out of Food in the Last Year: Never true     Ran Out of Food in the Last Year: Never true   Transportation Needs: No Transportation Needs (6/14/2024)    PRAPARE - Transportation     Lack of Transportation (Medical): No     Lack of Transportation (Non-Medical): No   Housing Stability: Low Risk  (6/14/2024)    Housing Stability Vital Sign     Unable to Pay for Housing in the Last Year: No     Number of Times Moved in the Last Year: 1     Homeless in the Last Year: No   Recent Concern: Housing Stability - High Risk (5/1/2024)    Housing Stability Vital Sign     Unable to Pay for Housing in the Last Year: No     Number of Times Moved in the Last Year: 2     Homeless in the Last Year: No       Current Outpatient Medications:     ALPRAZolam (XANAX) 0.5 mg tablet, Take 1 tablet (0.5 mg total) by mouth 3 (three) times a day as needed for anxiety, Disp: 30 tablet, Rfl: 0    tirzepatide (Zepbound) 5 mg/0.5 mL auto-injector, Inject 0.5 mL (5 mg total) under the skin once a week, Disp: 2 mL, Rfl: 3    Take biotin and collagen for hair health     Food Intake and  "Lifestyle Assessment   Food Intake Assessment completed via usual diet recall  Breakfast: 7 am drinks water   Fair life shake   Snack: 0   Lunch: 11 to 12 noon - left overs for night before   Chicken and vegetable and potatoes   Snack: hard boiled egg or quest protein   Dinner: 6 to 7 pm lean protein vegetable and starch   Lots of chicken , uses ground turkey instead of ground beef   Snack: no  Beverage intake: water  Protein supplement: one per day [facility]   Estimated protein intake per day: 60-70 grams   Estimated fluid intake per day: >70 oz or more per day   Meals eaten away from home: rarely   Typical meal pattern: 3 meals per day and 1 snacks per day  Eating Behaviors: Craves sweet foods- resolved with mediation   Food allergies or intolerances: No Known Allergies  Cultural or Mandaeism considerations: N/A    Physical Assessment  Physical Activity  Types of exercise: Walking 4-5 walks days per week for 30 minutes with her children   Aiming for 10 000 steps per day - averaging 5000 steps per day   Current physical limitations: knee were hurting but getting better with weight loss     Psychosocial Assessment   Support systems: spouse and mother   Socioeconomic factors: full time mother with 5 children   Lives with fiancee     Nutrition Diagnosis  Diagnosis: Overweight / Obesity (NC-3.3)  Related to: Physical inactivity and Excessive energy intake  As Evidenced by: BMI >25 and Unintentional weight gain     Nutrition Prescription: Recommend the following diet  1200 calories/ 95 grams protein (25% of calories )  100 oz of calorie free beverages per day     Interventions and Teaching   Discussed pre-op and post-op nutrition guidelines.       Patient educated and handouts provided.  Surgical changes to stomach / GI  Capacity of post-surgery stomach  Diet progression  Adequate hydration  Sugar and fat restriction to decrease \"dumping syndrome\"  Fat restriction to decrease steatorrhea  Expected weight loss  Weight " loss plateaus/ possibility of weight regain  Exercise  Suggestions for pre-op diet  Nutrition considerations after surgery  Protein supplements  Meal planning and preparation  Appropriate carbohydrate, protein, and fat intake, and food/fluid choices to maximize safe weight loss, nutrient intake, and tolerance   Dietary and lifestyle changes  Possible problems with poor eating habits  Intuitive eating  Techniques for self monitoring and keeping daily food journal  Potential for food intolerance after surgery, and ways to deal with them including: lactose intolerance, nausea, reflux, vomiting, diarrhea, food intolerance, appetite changes, gas  Vitamin / Mineral supplementation of Multivitamin with minerals, Calcium, Vitamin B12, Iron, Fat Soluble vitamins, and Vitamin D  Post-operative pregnancy guidelines- Pt is having IUD placed   Patient is not currently pregnant and doesn't desire to become pregnant a minimum of one year post-op    Education provided to: patient    Barriers to learning: No barriers identified    Readiness to change: action    Prior research on procedure: discussed with provider    Comprehension: demonstrated understanding and verbalizes understanding     Expected Compliance: good    Recommendations  Pt is an appropriate candidate for surgery. Yes    Workflow: (Incomplete in Bold):  Psych and/or D+A Clearance: n/a  Blood Work: 2025 needs updated August   PCP letter: done  Surgeon Appt: done  EGD: 2025  Cardiac Risk Assessment with EC2025  Sleep Studies: n/a  Nicotine test: n/a  Pre-Operative Program:  program requirement   NAFLD Score Calculated: n/a  Hepatology consult: n/a  Under Initial Office weight: yes      Evaluation / Monitoring  Dietitian to Monitor: Eating pattern as discussed Tolerance of nutrition prescription Body weight Lab values Physical activity Bowel pattern  Patient is taking vitamins and minerals as recommended. She  has been inconsistent with  food logging .   Admits that it is inconsistent but she is mindful concerning her food choices and portions.  She joined a gym and goes 2 x per week, on off days she will walk ( weather permitting ).  She is drinking ~80 oz of calories free beverages plus one protein shake per day for total of 91 oz per day. .  Since starting zepbound, she has lost 12.4% of her total body weight in preparation for surgery.  Overall, she is making significant lifestyle changes in preparation for surgery.     Goals  Food journal, Exercise 30 minutes 5 times per week, Complete lession plans 1-6, and Eat 3 meals per day  Food log 1200 calories/ 95 grams protein    oz of calories free beverages per day   Practice not drinking before during and after meals.   Get updated blood work done   F/U next month with [facility] provider for renewal of prescription     Time Spent:   30 minutes

## 2025-07-16 NOTE — TELEPHONE ENCOUNTER
Who called:PATIENT    Is the patient Pregnant ?No  If so, How many weeks? N/A    Reason for the Call:Appointment for michaela rosenberg, pt did 7 day Monistat treatment that ended 7/13 and now symptoms are worsening with cramping and low back pain, thick discharge, Severe itching.    Action Taken:Spoke to patient      Outcome/Plan/ Recommendations:  Made patient aware I was going to reach out to my  for an available appointment and call her back.

## 2025-07-17 ENCOUNTER — CLINICAL SUPPORT (OUTPATIENT)
Dept: BARIATRICS | Facility: CLINIC | Age: 30
End: 2025-07-17

## 2025-07-17 VITALS — WEIGHT: 255.8 LBS | BODY MASS INDEX: 45.31 KG/M2

## 2025-07-17 DIAGNOSIS — Z01.818 PRE-OPERATIVE CLEARANCE: Primary | ICD-10-CM

## 2025-07-17 DIAGNOSIS — E66.813 OBESITY, CLASS III, BMI 40-49.9 (MORBID OBESITY): ICD-10-CM

## 2025-07-17 DIAGNOSIS — R73.03 PREDIABETES: ICD-10-CM

## 2025-07-17 PROCEDURE — RECHECK: Performed by: DIETITIAN, REGISTERED

## 2025-07-18 ENCOUNTER — OFFICE VISIT (OUTPATIENT)
Dept: OBGYN CLINIC | Facility: MEDICAL CENTER | Age: 30
End: 2025-07-18
Payer: COMMERCIAL

## 2025-07-18 VITALS
WEIGHT: 254 LBS | DIASTOLIC BLOOD PRESSURE: 70 MMHG | HEIGHT: 63 IN | SYSTOLIC BLOOD PRESSURE: 112 MMHG | BODY MASS INDEX: 45 KG/M2

## 2025-07-18 DIAGNOSIS — Z72.51 HIGH RISK SEXUAL BEHAVIOR, UNSPECIFIED TYPE: ICD-10-CM

## 2025-07-18 DIAGNOSIS — B37.31 YEAST VAGINITIS: Primary | ICD-10-CM

## 2025-07-18 PROCEDURE — 87491 CHLMYD TRACH DNA AMP PROBE: CPT | Performed by: STUDENT IN AN ORGANIZED HEALTH CARE EDUCATION/TRAINING PROGRAM

## 2025-07-18 PROCEDURE — 87591 N.GONORRHOEAE DNA AMP PROB: CPT | Performed by: STUDENT IN AN ORGANIZED HEALTH CARE EDUCATION/TRAINING PROGRAM

## 2025-07-18 PROCEDURE — 99213 OFFICE O/P EST LOW 20 MIN: CPT | Performed by: STUDENT IN AN ORGANIZED HEALTH CARE EDUCATION/TRAINING PROGRAM

## 2025-07-18 RX ORDER — FLUCONAZOLE 150 MG/1
150 TABLET ORAL
Qty: 3 TABLET | Refills: 0 | Status: SHIPPED | OUTPATIENT
Start: 2025-07-18 | End: 2025-07-25

## 2025-07-18 NOTE — PROGRESS NOTES
"Name: Ramez Scott      : 1995      MRN: [id number]  Encounter Provider: Rosana Perez MD  Encounter Date: 2025   Encounter department: [facility]  :  Assessment & Plan  Yeast vaginitis    Orders:    fluconazole (DIFLUCAN) 150 mg tablet; Take 1 tablet (150 mg total) by mouth every third day for 3 doses    High risk sexual behavior, unspecified type    Orders:    Chlamydia/GC amplified DNA by PCR        History of Present Illness   Ramez presents with itchy vaginitis with thick discharge and musty odor. Desires Sti testing.      Ramez Scott is a 29 y.o. female who presents for vaginitis.  History obtained from: patient    Review of Systems   Constitutional:  Negative for chills and fever.   Respiratory:  Negative for cough and shortness of breath.    Cardiovascular:  Negative for chest pain and leg swelling.   Gastrointestinal:  Negative for abdominal pain, nausea and vomiting.   Genitourinary:  Negative for dysuria, frequency and urgency.   Neurological:  Negative for dizziness, light-headedness and headaches.     Medical History Reviewed by provider this encounter:     .     Objective   /70   Ht 5' 3\" (1.6 m)   Wt 115 kg (254 lb)   LMP 2025 (Approximate)   BMI 44.99 kg/m²      Physical Exam  Constitutional:       General: She is not in acute distress.     Appearance: Normal appearance.   HENT:      Head: Normocephalic and atraumatic.   Genitourinary:     General: Normal vulva.      Exam position: Lithotomy position.      Labia:         Right: No rash, tenderness, lesion or injury.         Left: No rash, tenderness, lesion or injury.       Urethra: No prolapse, urethral pain, urethral swelling or urethral lesion.      Vagina: Vaginal discharge present.      Cervix: No cervical motion tenderness, discharge, friability, lesion, erythema, cervical bleeding or eversion.      Uterus: Normal. Not deviated, not enlarged, not fixed, not tender and " no uterine prolapse.       Adnexa: Right adnexa normal and left adnexa normal.        Right: No mass, tenderness or fullness.          Left: No mass, tenderness or fullness.     Lymphadenopathy:      Lower Body: No right inguinal adenopathy. No left inguinal adenopathy.     Neurological:      Mental Status: She is alert.

## 2025-07-19 LAB
C TRACH DNA SPEC QL NAA+PROBE: NEGATIVE
N GONORRHOEA DNA SPEC QL NAA+PROBE: NEGATIVE

## 2025-07-28 ENCOUNTER — TELEPHONE (OUTPATIENT)
Age: 30
End: 2025-07-28

## 2025-07-28 DIAGNOSIS — E66.01 MORBID OBESITY (HCC): Primary | ICD-10-CM

## 2025-07-29 ENCOUNTER — APPOINTMENT (OUTPATIENT)
Dept: LAB | Facility: HOSPITAL | Age: 30
End: 2025-07-29
Payer: COMMERCIAL

## 2025-07-29 ENCOUNTER — RESULTS FOLLOW-UP (OUTPATIENT)
Dept: OTHER | Facility: HOSPITAL | Age: 30
End: 2025-07-29

## 2025-07-29 ENCOUNTER — OFFICE VISIT (OUTPATIENT)
Dept: LAB | Facility: HOSPITAL | Age: 30
End: 2025-07-29
Attending: SURGERY
Payer: COMMERCIAL

## 2025-07-29 DIAGNOSIS — R73.03 PREDIABETES: ICD-10-CM

## 2025-07-29 DIAGNOSIS — Z01.818 PRE-OPERATIVE CLEARANCE: ICD-10-CM

## 2025-07-29 DIAGNOSIS — E66.813 OBESITY, CLASS III, BMI 40-49.9 (MORBID OBESITY): ICD-10-CM

## 2025-07-29 DIAGNOSIS — E66.01 MORBID OBESITY (HCC): ICD-10-CM

## 2025-07-29 LAB
ALBUMIN SERPL BCG-MCNC: 4.3 G/DL (ref 3.5–5)
ALP SERPL-CCNC: 75 U/L (ref 34–104)
ALT SERPL W P-5'-P-CCNC: 16 U/L (ref 7–52)
ANION GAP SERPL CALCULATED.3IONS-SCNC: 5 MMOL/L (ref 4–13)
AST SERPL W P-5'-P-CCNC: 12 U/L (ref 13–39)
BILIRUB SERPL-MCNC: 0.5 MG/DL (ref 0.2–1)
BUN SERPL-MCNC: 11 MG/DL (ref 5–25)
CALCIUM SERPL-MCNC: 9.4 MG/DL (ref 8.4–10.2)
CHLORIDE SERPL-SCNC: 105 MMOL/L (ref 96–108)
CO2 SERPL-SCNC: 29 MMOL/L (ref 21–32)
CREAT SERPL-MCNC: 0.79 MG/DL (ref 0.6–1.3)
ERYTHROCYTE [DISTWIDTH] IN BLOOD BY AUTOMATED COUNT: 13.6 % (ref 11.6–15.1)
GFR SERPL CREATININE-BSD FRML MDRD: 101 ML/MIN/1.73SQ M
GLUCOSE P FAST SERPL-MCNC: 91 MG/DL (ref 65–99)
HCT VFR BLD AUTO: 40 % (ref 34.8–46.1)
HGB BLD-MCNC: 12.9 G/DL (ref 11.5–15.4)
MCH RBC QN AUTO: 28 PG (ref 26.8–34.3)
MCHC RBC AUTO-ENTMCNC: 32.3 G/DL (ref 31.4–37.4)
MCV RBC AUTO: 87 FL (ref 82–98)
PLATELET # BLD AUTO: 183 THOUSANDS/UL (ref 149–390)
PMV BLD AUTO: 12.1 FL (ref 8.9–12.7)
POTASSIUM SERPL-SCNC: 4.2 MMOL/L (ref 3.5–5.3)
PROT SERPL-MCNC: 7 G/DL (ref 6.4–8.4)
RBC # BLD AUTO: 4.61 MILLION/UL (ref 3.81–5.12)
SODIUM SERPL-SCNC: 139 MMOL/L (ref 135–147)
WBC # BLD AUTO: 8.04 THOUSAND/UL (ref 4.31–10.16)

## 2025-07-29 PROCEDURE — 85027 COMPLETE CBC AUTOMATED: CPT

## 2025-07-29 PROCEDURE — 93005 ELECTROCARDIOGRAM TRACING: CPT

## 2025-07-29 PROCEDURE — 80053 COMPREHEN METABOLIC PANEL: CPT

## 2025-07-29 PROCEDURE — 36415 COLL VENOUS BLD VENIPUNCTURE: CPT

## 2025-07-30 ENCOUNTER — PREP FOR PROCEDURE (OUTPATIENT)
Dept: BARIATRICS | Facility: CLINIC | Age: 30
End: 2025-07-30

## 2025-07-30 DIAGNOSIS — E66.01 MORBID OBESITY (HCC): Primary | ICD-10-CM

## 2025-07-30 LAB
ATRIAL RATE: 48 BPM
P AXIS: 13 DEGREES
PR INTERVAL: 122 MS
QRS AXIS: 27 DEGREES
QRSD INTERVAL: 96 MS
QT INTERVAL: 434 MS
QTC INTERVAL: 388 MS
T WAVE AXIS: 34 DEGREES
VENTRICULAR RATE: 48 BPM

## 2025-07-30 PROCEDURE — 93010 ELECTROCARDIOGRAM REPORT: CPT | Performed by: INTERNAL MEDICINE

## 2025-08-07 ENCOUNTER — OFFICE VISIT (OUTPATIENT)
Dept: BARIATRICS | Facility: CLINIC | Age: 30
End: 2025-08-07
Payer: COMMERCIAL

## 2025-08-07 ENCOUNTER — CLINICAL SUPPORT (OUTPATIENT)
Dept: BARIATRICS | Facility: CLINIC | Age: 30
End: 2025-08-07

## 2025-08-07 VITALS
DIASTOLIC BLOOD PRESSURE: 76 MMHG | SYSTOLIC BLOOD PRESSURE: 124 MMHG | BODY MASS INDEX: 44.92 KG/M2 | HEIGHT: 63 IN | HEART RATE: 57 BPM | OXYGEN SATURATION: 99 % | WEIGHT: 253.5 LBS | TEMPERATURE: 98.4 F

## 2025-08-07 DIAGNOSIS — R73.03 PREDIABETES: ICD-10-CM

## 2025-08-07 PROCEDURE — RECHECK: Performed by: DIETITIAN, REGISTERED

## 2025-08-07 PROCEDURE — 99213 OFFICE O/P EST LOW 20 MIN: CPT | Performed by: SURGERY

## 2025-08-07 RX ORDER — ENOXAPARIN SODIUM 300 MG/3ML
40 INJECTION INTRAVENOUS; SUBCUTANEOUS ONCE
OUTPATIENT
Start: 2025-08-07 | End: 2025-08-07

## 2025-08-08 DIAGNOSIS — E66.01 MORBID OBESITY (HCC): Primary | ICD-10-CM

## 2025-08-08 RX ORDER — OMEPRAZOLE 20 MG/1
20 CAPSULE, DELAYED RELEASE ORAL DAILY
Qty: 30 CAPSULE | Refills: 2 | Status: SHIPPED | OUTPATIENT
Start: 2025-08-08 | End: 2025-08-11 | Stop reason: SDUPTHER

## 2025-08-11 ENCOUNTER — TELEPHONE (OUTPATIENT)
Age: 30
End: 2025-08-11

## 2025-08-18 ENCOUNTER — TELEPHONE (OUTPATIENT)
Dept: BARIATRICS | Facility: CLINIC | Age: 30
End: 2025-08-18

## 2025-08-25 PROBLEM — E66.813 CLASS 3 SEVERE OBESITY DUE TO EXCESS CALORIES WITH BODY MASS INDEX (BMI) OF 40.0 TO 44.9 IN ADULT: Status: ACTIVE | Noted: 2024-04-30

## 2025-08-26 PROBLEM — Z98.84 S/P LAPAROSCOPIC SLEEVE GASTRECTOMY: Chronic | Status: ACTIVE | Noted: 2025-08-26
